# Patient Record
Sex: FEMALE | Race: WHITE | NOT HISPANIC OR LATINO | Employment: OTHER | URBAN - METROPOLITAN AREA
[De-identification: names, ages, dates, MRNs, and addresses within clinical notes are randomized per-mention and may not be internally consistent; named-entity substitution may affect disease eponyms.]

---

## 2020-05-04 ENCOUNTER — APPOINTMENT (OUTPATIENT)
Dept: CT IMAGING | Facility: HOSPITAL | Age: 70
End: 2020-05-04

## 2020-05-04 ENCOUNTER — HOSPITAL ENCOUNTER (OUTPATIENT)
Facility: HOSPITAL | Age: 70
Setting detail: OBSERVATION
Discharge: HOME HEALTH | End: 2020-05-07
Attending: EMERGENCY MEDICINE | Admitting: INTERNAL MEDICINE

## 2020-05-04 DIAGNOSIS — Z86.79 HISTORY OF HYPERTENSION: ICD-10-CM

## 2020-05-04 DIAGNOSIS — N28.9 RENAL INSUFFICIENCY: ICD-10-CM

## 2020-05-04 DIAGNOSIS — N17.9 AKI (ACUTE KIDNEY INJURY) (HCC): ICD-10-CM

## 2020-05-04 DIAGNOSIS — I10 ESSENTIAL HYPERTENSION: ICD-10-CM

## 2020-05-04 DIAGNOSIS — Z87.81 STATUS POST CLOSED FRACTURE OF LEFT HIP: ICD-10-CM

## 2020-05-04 DIAGNOSIS — R41.82 ALTERED MENTAL STATUS, UNSPECIFIED ALTERED MENTAL STATUS TYPE: Primary | ICD-10-CM

## 2020-05-04 DIAGNOSIS — Z74.09 IMPAIRED MOBILITY AND ADLS: ICD-10-CM

## 2020-05-04 DIAGNOSIS — Z86.39 HISTORY OF HYPERLIPIDEMIA: ICD-10-CM

## 2020-05-04 DIAGNOSIS — Z78.9 IMPAIRED MOBILITY AND ADLS: ICD-10-CM

## 2020-05-04 DIAGNOSIS — J44.9 CHRONIC OBSTRUCTIVE PULMONARY DISEASE, UNSPECIFIED COPD TYPE (HCC): ICD-10-CM

## 2020-05-04 LAB
ALBUMIN SERPL-MCNC: 4.1 G/DL (ref 3.5–5.2)
ALBUMIN/GLOB SERPL: 1.1 G/DL
ALP SERPL-CCNC: 84 U/L (ref 39–117)
ALT SERPL W P-5'-P-CCNC: 23 U/L (ref 1–33)
ANION GAP SERPL CALCULATED.3IONS-SCNC: 14 MMOL/L (ref 5–15)
AST SERPL-CCNC: 20 U/L (ref 1–32)
BACTERIA UR QL AUTO: NORMAL /HPF
BASOPHILS # BLD AUTO: 0.07 10*3/MM3 (ref 0–0.2)
BASOPHILS NFR BLD AUTO: 0.6 % (ref 0–1.5)
BILIRUB SERPL-MCNC: 0.2 MG/DL (ref 0.2–1.2)
BILIRUB UR QL STRIP: NEGATIVE
BUN BLD-MCNC: 32 MG/DL (ref 8–23)
BUN/CREAT SERPL: 24.2 (ref 7–25)
CALCIUM SPEC-SCNC: 9.8 MG/DL (ref 8.6–10.5)
CHLORIDE SERPL-SCNC: 102 MMOL/L (ref 98–107)
CK SERPL-CCNC: 91 U/L (ref 20–180)
CLARITY UR: CLEAR
CO2 SERPL-SCNC: 26 MMOL/L (ref 22–29)
COLOR UR: YELLOW
CREAT BLD-MCNC: 1.32 MG/DL (ref 0.57–1)
D-LACTATE SERPL-SCNC: 1 MMOL/L (ref 0.5–2)
DEPRECATED RDW RBC AUTO: 45.1 FL (ref 37–54)
EOSINOPHIL # BLD AUTO: 0.19 10*3/MM3 (ref 0–0.4)
EOSINOPHIL NFR BLD AUTO: 1.7 % (ref 0.3–6.2)
ERYTHROCYTE [DISTWIDTH] IN BLOOD BY AUTOMATED COUNT: 12.1 % (ref 12.3–15.4)
GFR SERPL CREATININE-BSD FRML MDRD: 40 ML/MIN/1.73
GLOBULIN UR ELPH-MCNC: 3.6 GM/DL
GLUCOSE BLD-MCNC: 131 MG/DL (ref 65–99)
GLUCOSE UR STRIP-MCNC: NEGATIVE MG/DL
HCT VFR BLD AUTO: 33.3 % (ref 34–46.6)
HGB BLD-MCNC: 10.6 G/DL (ref 12–15.9)
HGB UR QL STRIP.AUTO: NEGATIVE
HOLD SPECIMEN: NORMAL
HOLD SPECIMEN: NORMAL
HYALINE CASTS UR QL AUTO: NORMAL /LPF
IMM GRANULOCYTES # BLD AUTO: 0.04 10*3/MM3 (ref 0–0.05)
IMM GRANULOCYTES NFR BLD AUTO: 0.3 % (ref 0–0.5)
KETONES UR QL STRIP: NEGATIVE
LEUKOCYTE ESTERASE UR QL STRIP.AUTO: NEGATIVE
LIPASE SERPL-CCNC: 36 U/L (ref 13–60)
LYMPHOCYTES # BLD AUTO: 2.32 10*3/MM3 (ref 0.7–3.1)
LYMPHOCYTES NFR BLD AUTO: 20.2 % (ref 19.6–45.3)
MCH RBC QN AUTO: 32.3 PG (ref 26.6–33)
MCHC RBC AUTO-ENTMCNC: 31.8 G/DL (ref 31.5–35.7)
MCV RBC AUTO: 101.5 FL (ref 79–97)
MONOCYTES # BLD AUTO: 1.12 10*3/MM3 (ref 0.1–0.9)
MONOCYTES NFR BLD AUTO: 9.7 % (ref 5–12)
NEUTROPHILS # BLD AUTO: 7.76 10*3/MM3 (ref 1.7–7)
NEUTROPHILS NFR BLD AUTO: 67.5 % (ref 42.7–76)
NITRITE UR QL STRIP: NEGATIVE
NRBC BLD AUTO-RTO: 0 /100 WBC (ref 0–0.2)
NT-PROBNP SERPL-MCNC: 2622 PG/ML (ref 5–900)
PH UR STRIP.AUTO: 7 [PH] (ref 5–8)
PLATELET # BLD AUTO: 464 10*3/MM3 (ref 140–450)
PMV BLD AUTO: 9.6 FL (ref 6–12)
POTASSIUM BLD-SCNC: 4.4 MMOL/L (ref 3.5–5.2)
PROT SERPL-MCNC: 7.7 G/DL (ref 6–8.5)
PROT UR QL STRIP: ABNORMAL
RBC # BLD AUTO: 3.28 10*6/MM3 (ref 3.77–5.28)
RBC # UR: NORMAL /HPF
REF LAB TEST METHOD: NORMAL
SODIUM BLD-SCNC: 142 MMOL/L (ref 136–145)
SP GR UR STRIP: 1.01 (ref 1–1.03)
SQUAMOUS #/AREA URNS HPF: NORMAL /HPF
TROPONIN T SERPL-MCNC: <0.01 NG/ML (ref 0–0.03)
UROBILINOGEN UR QL STRIP: ABNORMAL
WBC NRBC COR # BLD: 11.5 10*3/MM3 (ref 3.4–10.8)
WBC UR QL AUTO: NORMAL /HPF
WHOLE BLOOD HOLD SPECIMEN: NORMAL
WHOLE BLOOD HOLD SPECIMEN: NORMAL

## 2020-05-04 PROCEDURE — 84484 ASSAY OF TROPONIN QUANT: CPT | Performed by: NURSE PRACTITIONER

## 2020-05-04 PROCEDURE — 82550 ASSAY OF CK (CPK): CPT | Performed by: NURSE PRACTITIONER

## 2020-05-04 PROCEDURE — 96365 THER/PROPH/DIAG IV INF INIT: CPT

## 2020-05-04 PROCEDURE — 81001 URINALYSIS AUTO W/SCOPE: CPT | Performed by: EMERGENCY MEDICINE

## 2020-05-04 PROCEDURE — 93005 ELECTROCARDIOGRAM TRACING: CPT | Performed by: NURSE PRACTITIONER

## 2020-05-04 PROCEDURE — 83690 ASSAY OF LIPASE: CPT | Performed by: EMERGENCY MEDICINE

## 2020-05-04 PROCEDURE — 96375 TX/PRO/DX INJ NEW DRUG ADDON: CPT

## 2020-05-04 PROCEDURE — 84145 PROCALCITONIN (PCT): CPT | Performed by: INTERNAL MEDICINE

## 2020-05-04 PROCEDURE — 70450 CT HEAD/BRAIN W/O DYE: CPT

## 2020-05-04 PROCEDURE — 25010000002 ONDANSETRON PER 1 MG: Performed by: EMERGENCY MEDICINE

## 2020-05-04 PROCEDURE — 80306 DRUG TEST PRSMV INSTRMNT: CPT | Performed by: NURSE PRACTITIONER

## 2020-05-04 PROCEDURE — 99285 EMERGENCY DEPT VISIT HI MDM: CPT

## 2020-05-04 PROCEDURE — 87040 BLOOD CULTURE FOR BACTERIA: CPT | Performed by: NURSE PRACTITIONER

## 2020-05-04 PROCEDURE — 96366 THER/PROPH/DIAG IV INF ADDON: CPT

## 2020-05-04 PROCEDURE — 71250 CT THORAX DX C-: CPT

## 2020-05-04 PROCEDURE — 85025 COMPLETE CBC W/AUTO DIFF WBC: CPT

## 2020-05-04 PROCEDURE — 83880 ASSAY OF NATRIURETIC PEPTIDE: CPT | Performed by: NURSE PRACTITIONER

## 2020-05-04 PROCEDURE — 80053 COMPREHEN METABOLIC PANEL: CPT | Performed by: EMERGENCY MEDICINE

## 2020-05-04 PROCEDURE — 83605 ASSAY OF LACTIC ACID: CPT | Performed by: NURSE PRACTITIONER

## 2020-05-04 RX ORDER — CARVEDILOL 12.5 MG/1
12.5 TABLET ORAL 2 TIMES DAILY WITH MEALS
COMMUNITY

## 2020-05-04 RX ORDER — ROSUVASTATIN CALCIUM 10 MG/1
10 TABLET, COATED ORAL DAILY
COMMUNITY

## 2020-05-04 RX ORDER — ONDANSETRON 2 MG/ML
4 INJECTION INTRAMUSCULAR; INTRAVENOUS ONCE
Status: COMPLETED | OUTPATIENT
Start: 2020-05-04 | End: 2020-05-04

## 2020-05-04 RX ORDER — CETIRIZINE HYDROCHLORIDE 10 MG/1
10 TABLET ORAL DAILY
COMMUNITY

## 2020-05-04 RX ORDER — SODIUM CHLORIDE 0.9 % (FLUSH) 0.9 %
10 SYRINGE (ML) INJECTION AS NEEDED
Status: DISCONTINUED | OUTPATIENT
Start: 2020-05-04 | End: 2020-05-07 | Stop reason: HOSPADM

## 2020-05-04 RX ORDER — ASPIRIN 81 MG/1
81 TABLET, CHEWABLE ORAL DAILY
COMMUNITY
End: 2021-05-25 | Stop reason: HOSPADM

## 2020-05-04 RX ORDER — DOCUSATE SODIUM 100 MG/1
100 CAPSULE, LIQUID FILLED ORAL 2 TIMES DAILY
COMMUNITY

## 2020-05-04 RX ORDER — HYDROMORPHONE HYDROCHLORIDE 1 MG/ML
0.5 INJECTION, SOLUTION INTRAMUSCULAR; INTRAVENOUS; SUBCUTANEOUS ONCE
Status: DISCONTINUED | OUTPATIENT
Start: 2020-05-04 | End: 2020-05-04

## 2020-05-04 RX ORDER — MONTELUKAST SODIUM 10 MG/1
10 TABLET ORAL DAILY
COMMUNITY

## 2020-05-04 RX ORDER — LISINOPRIL 5 MG/1
5 TABLET ORAL DAILY
COMMUNITY

## 2020-05-04 RX ADMIN — ONDANSETRON 4 MG: 2 INJECTION INTRAMUSCULAR; INTRAVENOUS at 21:51

## 2020-05-04 RX ADMIN — SODIUM CHLORIDE 500 ML: 9 INJECTION, SOLUTION INTRAVENOUS at 21:04

## 2020-05-05 ENCOUNTER — APPOINTMENT (OUTPATIENT)
Dept: ULTRASOUND IMAGING | Facility: HOSPITAL | Age: 70
End: 2020-05-05

## 2020-05-05 ENCOUNTER — APPOINTMENT (OUTPATIENT)
Dept: CT IMAGING | Facility: HOSPITAL | Age: 70
End: 2020-05-05

## 2020-05-05 PROBLEM — N17.9 AKI (ACUTE KIDNEY INJURY) (HCC): Status: ACTIVE | Noted: 2020-05-05

## 2020-05-05 PROBLEM — F10.20 ALCOHOLISM (HCC): Status: ACTIVE | Noted: 2020-05-05

## 2020-05-05 PROBLEM — I10 ESSENTIAL HYPERTENSION: Status: ACTIVE | Noted: 2020-05-05

## 2020-05-05 PROBLEM — D72.829 LEUKOCYTOSIS: Status: ACTIVE | Noted: 2020-05-05

## 2020-05-05 PROBLEM — R10.9 ABDOMINAL PAIN: Status: ACTIVE | Noted: 2020-05-05

## 2020-05-05 PROBLEM — J44.9 COPD (CHRONIC OBSTRUCTIVE PULMONARY DISEASE) (HCC): Status: ACTIVE | Noted: 2020-05-05

## 2020-05-05 PROBLEM — D64.9 ANEMIA: Status: ACTIVE | Noted: 2020-05-05

## 2020-05-05 PROBLEM — R11.2 INTRACTABLE NAUSEA AND VOMITING: Status: ACTIVE | Noted: 2020-05-05

## 2020-05-05 PROBLEM — R41.82 AMS (ALTERED MENTAL STATUS): Status: ACTIVE | Noted: 2020-05-05

## 2020-05-05 LAB
ALBUMIN SERPL-MCNC: 3.7 G/DL (ref 3.5–5.2)
ALBUMIN/GLOB SERPL: 1.2 G/DL
ALP SERPL-CCNC: 77 U/L (ref 39–117)
ALT SERPL W P-5'-P-CCNC: 20 U/L (ref 1–33)
AMMONIA BLD-SCNC: 41 UMOL/L (ref 11–51)
AMPHET+METHAMPHET UR QL: NEGATIVE
AMPHETAMINES UR QL: NEGATIVE
ANION GAP SERPL CALCULATED.3IONS-SCNC: 12 MMOL/L (ref 5–15)
AST SERPL-CCNC: 19 U/L (ref 1–32)
BARBITURATES UR QL SCN: NEGATIVE
BENZODIAZ UR QL SCN: NEGATIVE
BILIRUB SERPL-MCNC: 0.3 MG/DL (ref 0.2–1.2)
BUN BLD-MCNC: 20 MG/DL (ref 8–23)
BUN/CREAT SERPL: 18.5 (ref 7–25)
BUPRENORPHINE SERPL-MCNC: NEGATIVE NG/ML
CALCIUM SPEC-SCNC: 9.4 MG/DL (ref 8.6–10.5)
CANNABINOIDS SERPL QL: NEGATIVE
CHLORIDE SERPL-SCNC: 99 MMOL/L (ref 98–107)
CO2 SERPL-SCNC: 25 MMOL/L (ref 22–29)
COCAINE UR QL: NEGATIVE
CREAT BLD-MCNC: 1.08 MG/DL (ref 0.57–1)
DEPRECATED RDW RBC AUTO: 44.6 FL (ref 37–54)
ERYTHROCYTE [DISTWIDTH] IN BLOOD BY AUTOMATED COUNT: 12.1 % (ref 12.3–15.4)
ETHANOL BLD-MCNC: <10 MG/DL (ref 0–10)
FOLATE SERPL-MCNC: >20 NG/ML (ref 4.78–24.2)
GFR SERPL CREATININE-BSD FRML MDRD: 50 ML/MIN/1.73
GLOBULIN UR ELPH-MCNC: 3.1 GM/DL
GLUCOSE BLD-MCNC: 89 MG/DL (ref 65–99)
HCT VFR BLD AUTO: 30.8 % (ref 34–46.6)
HGB BLD-MCNC: 10.1 G/DL (ref 12–15.9)
MCH RBC QN AUTO: 33.2 PG (ref 26.6–33)
MCHC RBC AUTO-ENTMCNC: 32.8 G/DL (ref 31.5–35.7)
MCV RBC AUTO: 101.3 FL (ref 79–97)
METHADONE UR QL SCN: NEGATIVE
OPIATES UR QL: POSITIVE
OXYCODONE UR QL SCN: NEGATIVE
PCP UR QL SCN: NEGATIVE
PLATELET # BLD AUTO: 407 10*3/MM3 (ref 140–450)
PMV BLD AUTO: 9.6 FL (ref 6–12)
POTASSIUM BLD-SCNC: 4.2 MMOL/L (ref 3.5–5.2)
PROCALCITONIN SERPL-MCNC: 0.08 NG/ML (ref 0.1–0.25)
PROPOXYPH UR QL: NEGATIVE
PROT SERPL-MCNC: 6.8 G/DL (ref 6–8.5)
RBC # BLD AUTO: 3.04 10*6/MM3 (ref 3.77–5.28)
SODIUM BLD-SCNC: 136 MMOL/L (ref 136–145)
TRICYCLICS UR QL SCN: NEGATIVE
VIT B12 BLD-MCNC: 562 PG/ML (ref 211–946)
WBC NRBC COR # BLD: 9.34 10*3/MM3 (ref 3.4–10.8)

## 2020-05-05 PROCEDURE — 97162 PT EVAL MOD COMPLEX 30 MIN: CPT

## 2020-05-05 PROCEDURE — 25010000002 HEPARIN (PORCINE) PER 1000 UNITS: Performed by: INTERNAL MEDICINE

## 2020-05-05 PROCEDURE — 82140 ASSAY OF AMMONIA: CPT | Performed by: INTERNAL MEDICINE

## 2020-05-05 PROCEDURE — 82607 VITAMIN B-12: CPT | Performed by: PHYSICIAN ASSISTANT

## 2020-05-05 PROCEDURE — 96366 THER/PROPH/DIAG IV INF ADDON: CPT

## 2020-05-05 PROCEDURE — 74177 CT ABD & PELVIS W/CONTRAST: CPT

## 2020-05-05 PROCEDURE — 80307 DRUG TEST PRSMV CHEM ANLYZR: CPT | Performed by: PHYSICIAN ASSISTANT

## 2020-05-05 PROCEDURE — G0378 HOSPITAL OBSERVATION PER HR: HCPCS

## 2020-05-05 PROCEDURE — 94799 UNLISTED PULMONARY SVC/PX: CPT

## 2020-05-05 PROCEDURE — 25010000002 THIAMINE PER 100 MG: Performed by: PHYSICIAN ASSISTANT

## 2020-05-05 PROCEDURE — 80053 COMPREHEN METABOLIC PANEL: CPT | Performed by: PHYSICIAN ASSISTANT

## 2020-05-05 PROCEDURE — 25010000002 THIAMINE PER 100 MG: Performed by: INTERNAL MEDICINE

## 2020-05-05 PROCEDURE — 25010000002 IOPAMIDOL 61 % SOLUTION: Performed by: EMERGENCY MEDICINE

## 2020-05-05 PROCEDURE — 76775 US EXAM ABDO BACK WALL LIM: CPT

## 2020-05-05 PROCEDURE — 96372 THER/PROPH/DIAG INJ SC/IM: CPT

## 2020-05-05 PROCEDURE — 84425 ASSAY OF VITAMIN B-1: CPT | Performed by: INTERNAL MEDICINE

## 2020-05-05 PROCEDURE — 96365 THER/PROPH/DIAG IV INF INIT: CPT

## 2020-05-05 PROCEDURE — 82746 ASSAY OF FOLIC ACID SERUM: CPT | Performed by: PHYSICIAN ASSISTANT

## 2020-05-05 PROCEDURE — 99220 PR INITIAL OBSERVATION CARE/DAY 70 MINUTES: CPT | Performed by: INTERNAL MEDICINE

## 2020-05-05 PROCEDURE — 85027 COMPLETE CBC AUTOMATED: CPT | Performed by: PHYSICIAN ASSISTANT

## 2020-05-05 RX ORDER — SODIUM CHLORIDE 0.9 % (FLUSH) 0.9 %
10 SYRINGE (ML) INJECTION AS NEEDED
Status: DISCONTINUED | OUTPATIENT
Start: 2020-05-05 | End: 2020-05-07 | Stop reason: HOSPADM

## 2020-05-05 RX ORDER — SODIUM CHLORIDE 9 MG/ML
75 INJECTION, SOLUTION INTRAVENOUS CONTINUOUS
Status: DISCONTINUED | OUTPATIENT
Start: 2020-05-05 | End: 2020-05-07

## 2020-05-05 RX ORDER — DOCUSATE SODIUM 100 MG/1
100 CAPSULE, LIQUID FILLED ORAL 2 TIMES DAILY PRN
Status: DISCONTINUED | OUTPATIENT
Start: 2020-05-05 | End: 2020-05-07 | Stop reason: HOSPADM

## 2020-05-05 RX ORDER — HEPARIN SODIUM 5000 [USP'U]/ML
5000 INJECTION, SOLUTION INTRAVENOUS; SUBCUTANEOUS EVERY 12 HOURS SCHEDULED
Status: DISCONTINUED | OUTPATIENT
Start: 2020-05-05 | End: 2020-05-07 | Stop reason: HOSPADM

## 2020-05-05 RX ORDER — ASPIRIN 81 MG/1
81 TABLET, CHEWABLE ORAL DAILY
Status: DISCONTINUED | OUTPATIENT
Start: 2020-05-05 | End: 2020-05-07 | Stop reason: HOSPADM

## 2020-05-05 RX ORDER — ACETAMINOPHEN 325 MG/1
650 TABLET ORAL EVERY 4 HOURS PRN
Status: DISCONTINUED | OUTPATIENT
Start: 2020-05-05 | End: 2020-05-07 | Stop reason: HOSPADM

## 2020-05-05 RX ORDER — LISINOPRIL 5 MG/1
5 TABLET ORAL DAILY
Status: CANCELLED | OUTPATIENT
Start: 2020-05-05

## 2020-05-05 RX ORDER — LIDOCAINE 50 MG/G
1 PATCH TOPICAL
Status: DISCONTINUED | OUTPATIENT
Start: 2020-05-05 | End: 2020-05-07 | Stop reason: HOSPADM

## 2020-05-05 RX ORDER — ACETAMINOPHEN, ASPIRIN AND CAFFEINE 250; 250; 65 MG/1; MG/1; MG/1
2 TABLET, FILM COATED ORAL ONCE
Status: COMPLETED | OUTPATIENT
Start: 2020-05-05 | End: 2020-05-05

## 2020-05-05 RX ORDER — CETIRIZINE HYDROCHLORIDE 10 MG/1
5 TABLET ORAL DAILY
Status: DISCONTINUED | OUTPATIENT
Start: 2020-05-05 | End: 2020-05-07 | Stop reason: HOSPADM

## 2020-05-05 RX ORDER — CHOLECALCIFEROL (VITAMIN D3) 125 MCG
5 CAPSULE ORAL NIGHTLY PRN
Status: DISCONTINUED | OUTPATIENT
Start: 2020-05-05 | End: 2020-05-07 | Stop reason: HOSPADM

## 2020-05-05 RX ORDER — CARVEDILOL 12.5 MG/1
12.5 TABLET ORAL 2 TIMES DAILY WITH MEALS
Status: DISCONTINUED | OUTPATIENT
Start: 2020-05-05 | End: 2020-05-07 | Stop reason: HOSPADM

## 2020-05-05 RX ORDER — ROSUVASTATIN CALCIUM 10 MG/1
10 TABLET, COATED ORAL DAILY
Status: DISCONTINUED | OUTPATIENT
Start: 2020-05-05 | End: 2020-05-07 | Stop reason: HOSPADM

## 2020-05-05 RX ORDER — BACLOFEN 20 MG/1
20 TABLET ORAL DAILY PRN
COMMUNITY
End: 2020-05-07 | Stop reason: HOSPADM

## 2020-05-05 RX ORDER — IPRATROPIUM BROMIDE AND ALBUTEROL SULFATE 2.5; .5 MG/3ML; MG/3ML
3 SOLUTION RESPIRATORY (INHALATION) EVERY 4 HOURS PRN
Status: DISCONTINUED | OUTPATIENT
Start: 2020-05-05 | End: 2020-05-07 | Stop reason: HOSPADM

## 2020-05-05 RX ORDER — ONDANSETRON 2 MG/ML
4 INJECTION INTRAMUSCULAR; INTRAVENOUS EVERY 6 HOURS PRN
Status: DISCONTINUED | OUTPATIENT
Start: 2020-05-05 | End: 2020-05-07 | Stop reason: HOSPADM

## 2020-05-05 RX ORDER — MONTELUKAST SODIUM 10 MG/1
10 TABLET ORAL NIGHTLY
Status: DISCONTINUED | OUTPATIENT
Start: 2020-05-05 | End: 2020-05-07 | Stop reason: HOSPADM

## 2020-05-05 RX ORDER — SODIUM CHLORIDE 0.9 % (FLUSH) 0.9 %
10 SYRINGE (ML) INJECTION EVERY 12 HOURS SCHEDULED
Status: DISCONTINUED | OUTPATIENT
Start: 2020-05-05 | End: 2020-05-07 | Stop reason: HOSPADM

## 2020-05-05 RX ADMIN — FOLIC ACID 100 ML/HR: 5 INJECTION, SOLUTION INTRAMUSCULAR; INTRAVENOUS; SUBCUTANEOUS at 21:27

## 2020-05-05 RX ADMIN — ACETAMINOPHEN 650 MG: 325 TABLET, FILM COATED ORAL at 18:25

## 2020-05-05 RX ADMIN — ASPIRIN 81 MG 81 MG: 81 TABLET ORAL at 08:22

## 2020-05-05 RX ADMIN — CARVEDILOL 12.5 MG: 12.5 TABLET, FILM COATED ORAL at 08:22

## 2020-05-05 RX ADMIN — ACETAMINOPHEN 650 MG: 325 TABLET, FILM COATED ORAL at 14:13

## 2020-05-05 RX ADMIN — SODIUM CHLORIDE, PRESERVATIVE FREE 10 ML: 5 INJECTION INTRAVENOUS at 21:23

## 2020-05-05 RX ADMIN — SODIUM CHLORIDE 75 ML/HR: 9 INJECTION, SOLUTION INTRAVENOUS at 18:26

## 2020-05-05 RX ADMIN — LIDOCAINE 1 PATCH: 50 PATCH CUTANEOUS at 11:10

## 2020-05-05 RX ADMIN — FOLIC ACID 100 ML/HR: 5 INJECTION, SOLUTION INTRAMUSCULAR; INTRAVENOUS; SUBCUTANEOUS at 01:15

## 2020-05-05 RX ADMIN — CETIRIZINE HYDROCHLORIDE 5 MG: 10 TABLET, FILM COATED ORAL at 08:22

## 2020-05-05 RX ADMIN — MELATONIN TAB 5 MG 5 MG: 5 TAB at 21:22

## 2020-05-05 RX ADMIN — MONTELUKAST SODIUM 10 MG: 10 TABLET, COATED ORAL at 21:22

## 2020-05-05 RX ADMIN — HEPARIN SODIUM 5000 UNITS: 5000 INJECTION, SOLUTION INTRAVENOUS; SUBCUTANEOUS at 21:21

## 2020-05-05 RX ADMIN — SODIUM CHLORIDE, PRESERVATIVE FREE 10 ML: 5 INJECTION INTRAVENOUS at 08:24

## 2020-05-05 RX ADMIN — IOPAMIDOL 60 ML: 612 INJECTION, SOLUTION INTRAVENOUS at 01:28

## 2020-05-05 RX ADMIN — FOLIC ACID 100 ML/HR: 5 INJECTION, SOLUTION INTRAMUSCULAR; INTRAVENOUS; SUBCUTANEOUS at 08:23

## 2020-05-05 RX ADMIN — ROSUVASTATIN CALCIUM 10 MG: 10 TABLET, COATED ORAL at 08:23

## 2020-05-05 RX ADMIN — ACETAMINOPHEN, ASPIRIN AND CAFFEINE 2 TABLET: 250; 250; 65 TABLET, FILM COATED ORAL at 21:22

## 2020-05-05 RX ADMIN — FOLIC ACID 100 ML/HR: 5 INJECTION, SOLUTION INTRAMUSCULAR; INTRAVENOUS; SUBCUTANEOUS at 21:22

## 2020-05-05 RX ADMIN — CARVEDILOL 12.5 MG: 12.5 TABLET, FILM COATED ORAL at 17:31

## 2020-05-05 NOTE — PROGRESS NOTES
Discharge Planning Assessment  [facility]     Patient Name: Emily Cevallos  MRN: [id number]  Today's Date: 5/5/2020    Admit Date: 5/4/2020    Discharge Needs Assessment     Row Name 05/05/20 1521       Living Environment    Lives With  friend(s) Pt resides in [facility]    Name(s) of Who Lives With Patient  Friend/poa Tara Bear    Current Living Arrangements  home/apartment/condo    Primary Care Provided by  friend    Provides Primary Care For  no one, unable/limited ability to care for self    Family Caregiver if Needed  friend(s)    Family Caregiver Names  Tara Bear    Quality of Family Relationships  helpful;involved;supportive    Able to Return to Prior Arrangements  other (see comments) TBD       Resource/Environmental Concerns    Resource/Environmental Concerns  none       Transition Planning    Patient/Family Anticipates Transition to  inpatient rehabilitation facility    Patient/Family Anticipated Services at Transition  rehabilitation services    Transportation Anticipated  family or friend will provide       Discharge Needs Assessment    Readmission Within the Last 30 Days  other (see comments) pt was at [facility] on 4/4 related to hip fracture    Concerns to be Addressed  discharge planning    Equipment Currently Used at Home  walker, rolling;commode    Anticipated Changes Related to Illness  none    Equipment Needed After Discharge  none    Outpatient/Agency/Support Group Needs  skilled nursing facility    Discharge Facility/Level of Care Needs  nursing facility, skilled    Provided Post Acute Provider List?  Yes    Post Acute Provider List  Inpatient Rehab    Delivered To  Patient    Method of Delivery  In person    Patient's Choice of Community Agency(s)  [facility], [facility] or [facility]        Discharge Plan     Row Name 05/05/20 1524       Plan    Plan  SNF, skilled    Provided Post Acute Provider Quality & Resource List?  Yes    Post Acute Provider  Quality and Resource List  Inpatient Rehab    Patient/Family in Agreement with Plan  yes    Plan Comments  CM spoke with pt at bedside. Pt reports she lives with her friend Tara Bear who is also her POA. Pt reports she currently needs more assistance than normal as she recently fractured her hip and had surgery at [facility] and then went to [facility] for approximately 2 weeks. Pt has a rolling walker and bedside commode. Pt is not current with home health or outpatient services at this time. CM discussed discharge options with pt including inpt skilled rehab and home with home health.  Pt is agreeable to go to skilled rehab for continued inpatient care. CM reviewed available SNF options with pt that are covered by her insurance including [facility], [facility], [facility] and [facility]. Pt reports she has cousins that have been to [place] and she lives near [facility] however has no preference. CM made referrals to all facilities and will await PT/OT evals and facilities to review for bed offers. Pt requested KORY to update JENNIFER Pacheco of plans. KORY spoke with Tara via phone and she is agreeable to discharge plans to SNF. KORY explained observation status and Tara verbalized understanding. KORY will continue to follow.     Final Discharge Disposition Code  03 - skilled nursing facility (SNF)        Destination      Service Provider Request Status Selected Services Address Phone Number Fax Number    [facility] Pending - No Request Sent N/A [address], [place] [id number] [phone]    [facility] Pending - No Request Sent N/A [address], [facility] [address] [phone]    [facility] Pending - No Request Sent N/A [address], [place] [id number] [phone]    [facility] Pending - No Request Sent N/A [address], [facility] [address]  [phone]      Durable Medical Equipment      Coordination has not been started for this encounter.      Dialysis/Infusion      Coordination has not been started for this encounter.      Home Medical Care      Coordination has not been started for this encounter.      Therapy      Coordination has not been started for this encounter.      Community Resources      Coordination has not been started for this encounter.          Demographic Summary     Row Name 05/05/20 1512       General Information    Admission Type  observation    Referral Source  admission list    Reason for Consult  discharge planning    Preferred Language  English    General Information Comments  PCP- FLORENCE Major       Contact Information    Permission Granted to Share Info With          Functional Status     Row Name 05/05/20 1513       Functional Status    Usual Activity Tolerance  fair    Current Activity Tolerance  other (see comments) awaiting pt/ot evals       Functional Status, IADL    Medications  assistive person    Meal Preparation  assistive equipment and person    Housekeeping  assistive equipment and person    Laundry  assistive equipment and person    Shopping  assistive equipment and person       Employment/    Employment/ Comments  Pt confirms she has [employer] Medicare, denies concerns or disruption in coverage. Pt has prescription coverage and denies issues obtaining or affording current medications.         Psychosocial    No documentation.       Abuse/Neglect    No documentation.       Legal    No documentation.       Substance Abuse    No documentation.       Patient Forms    No documentation.           Lara Shepherd RN

## 2020-05-05 NOTE — PLAN OF CARE
Problem: Patient Care Overview  Goal: Plan of Care Review  Outcome: Ongoing (interventions implemented as appropriate)  Flowsheets (Taken 5/5/2020 3118)  Progress: improving  Plan of Care Reviewed With: patient  Outcome Summary: PT eval is completed patient is able to perform bed mobility with min assist and is able to ambulate 30 ft with min assist of 1 using walker, anticipate patient will be able to go home with friend's assist at D/C

## 2020-05-05 NOTE — CONSULTS
"Clinical Nutrition   Reason For Visit: Identified at risk by screening criteria, Nurse practioner/physician assistent consult, Malnutrition Severity Assessment, BMI    Patient Name: Emily Cevallos  YOB: 1950  MRN: [id number]  Date of Encounter: 05/05/20 12:32  Admission date: 5/4/2020      Nutrition Assessment     Admission Problem List:  Pt had a recent fall (4/4) and sustained a L hip fracture, s/p ORIF (4/5) at OSH, pt then went to [facility] for 2 weeks and was discharged home. Pt readmitted to OSH from (4/26-5/1) with confusion.     AMS  LIDA      Applicable PMH:  HTN  COPD  Tongue cancer  EtOH abuse  Recent L hip fracture, s/p ORIF (4/5/2020)       Reported/Observed/Food/Nutrition Related History     Pt reports good appetite, eating lunch at time of RD visit. States that she was eating well at home and at [facility], but that she was not eating well at [facility] because she did not like the food/food tasted bland. States that she does not have any food allergies. Reports that her teeth do not fit properly, which makes it difficult for her to chew, pt requesting soft textures/ground meats. Pt states that she drinks chocolate Ensure 1x/day at home. Pt reports that she does not have any food preferences, states that she is not a picky eater.      Anthropometrics   Height: 67 in  Weight: 97 lb per stated wt (5/4)  BMI: 15.2  BMI classification: Underweight:<18.5kg/m2     UBW: 115 lb per pt- pt states that she last weighed this \"a long time ago.\"  Weight change: Pt reports that she has lost weight. Upon further interview, pt states that she weighed 97 lb at [facility]. Pt then states that she weighed 106 lb at a doctor's appointment. RD asked pt when the doctor's appointment was, but pt states that she does not remember. Pt reports that she weighed 115 lb \"a long time ago\", but pt does not know the time frame or amount of weight loss. Unfortunately, the only weight that is available in Epic is a stated weight. Would be " beneficial to obtain and document a measured/objective weight to better determine pt's current weight.     Date Weight (kg) Weight (lbs) Weight Method   5/4/2020 43.999 kg 97 lb Stated       Labs reviewed   Labs reviewed: Yes    Results from last 7 days   Lab Units 05/05/20  0752 05/04/20  1829   SODIUM mmol/L 136 142   POTASSIUM mmol/L 4.2 4.4   CHLORIDE mmol/L 99 102   CO2 mmol/L 25.0 26.0   BUN mg/dL 20 32*   CREATININE mg/dL 1.08* 1.32*   GLUCOSE mg/dL 89 131*   CALCIUM mg/dL 9.4 9.8     Results from last 7 days   Lab Units 05/05/20  0752 05/04/20  1829   WBC 10*3/mm3 9.34 11.50*   ALBUMIN g/dL 3.70 4.10         Lab Results   Lab Value Date/Time    HGBA1C 4.70 (L) 05/25/2017 1224     Medications reviewed   Medications reviewed: Yes  Pertinent: rally pack  GTT: NS at 75 ml/hr      Current Nutrition Prescription   PO: Diet Soft Texture; Ground-- entered by RD prior to charting, pt was on a regular diet prior to this      Evaluation of Received Nutrient/Fluid Intake-PO:  Insufficient data       Nutrition Diagnosis   5/5  Problem Underweight   Etiology Unsure   Signs/Symptoms Unclear if this has been acute vs chronic. Pt reports good appetite.        Intervention   Intervention: Follow treatment progress, Care plan reviewed, Interview for preferences, Menu adjusted, Encourage intake, Supplement provided   -Diet order adjusted- soft/ground  -Will send chocolate Ensure 3x/day  -Will order measured weight, as objective data is needed to better determine pt's current weight      Goal:   General: Nutrition support treatment  PO: Establish PO       Monitoring/Evaluation:       Monitoring/Evaluation: Per protocol, PO intake, Supplement intake, Pertinent labs, Weight, Symptoms     Will Continue to follow per protocol  Lanie Cheney MS RD/LD CNSC  Time Spent: 30 minutes   Declines

## 2020-05-05 NOTE — THERAPY EVALUATION
"Acute Care - Physical Therapy Initial Evaluation  [facility]     Patient Name: Emily Cevallos  : 1950  MRN: [id number]  Today's Date: 2020                Admit Date: 2020    Visit Dx:     ICD-10-CM ICD-9-CM   1. Altered mental status, unspecified altered mental status type R41.82 780.97   2. Renal insufficiency N28.9 593.9   3. History of hyperlipidemia Z86.39 V12.29   4. History of hypertension Z86.79 V12.59   5. Status post closed fracture of left hip Z87.81 V15.51     Patient Active Problem List   Diagnosis   • Essential hypertension   • Alcoholism (CMS/HCC)   • AMS (altered mental status)   • LIDA (acute kidney injury) (CMS/HCC)   • Anemia   • Leukocytosis   • COPD (chronic obstructive pulmonary disease) (CMS/HCC)   • Intractable nausea and vomiting   • Abdominal pain     Past Medical History:   Diagnosis Date   • Alcoholism (CMS/HCC)    • Cancer (CMS/HCC)     \"TONGUE CANCER\"   • Hypertension      Past Surgical History:   Procedure Laterality Date   • TOTAL HIP ARTHROPLASTY          PT ASSESSMENT (last 12 hours)      Physical Therapy Evaluation    No documentation.         PT Recommendation and Plan  Anticipated Discharge Disposition (PT): home with assist  Planned Therapy Interventions (PT Eval): balance training, bed mobility training, gait training, home exercise program, transfer training, strengthening  Therapy Frequency (PT Clinical Impression): daily  Outcome Summary/Treatment Plan (PT)  Anticipated Equipment Needs at Discharge (PT): front wheeled walker  Anticipated Discharge Disposition (PT): home with assist  Plan of Care Reviewed With: patient  Progress: improving  Outcome Summary: PT eval is completed patient is able to perform bed mobility with min assist and is able to ambulate 30 ft with min assist of 1 using walker, anticipate patient will be able to go home with friend's assist at D/C     Time Calculation:   PT Charges     Row Name 20 7337             Time Calculation    " Start Time  1430  -DAYANARA      PT Received On  05/05/20  -DAYANARA      PT Goal Re-Cert Due Date  05/15/20  -DAYANARA        User Key  (r) = Recorded By, (t) = Taken By, (c) = Cosigned By    Initials Name Provider Type    Juju Justice, PT Physical Therapist        Therapy Charges for Today     Code Description Service Date Service Provider Modifiers Qty    51040524463 HC PT EVAL MOD COMPLEXITY 4 5/5/2020 Juju Whyte, PT GP 1          PT G-Codes  Outcome Measure Options: AM-PAC 6 Clicks Basic Mobility (PT)  AM-PAC 6 Clicks Score (PT): 19      Juju Whyte, PT  5/5/2020

## 2020-05-05 NOTE — PROGRESS NOTES
[facility] Medicine Services  ADMISSION FOLLOW-UP NOTE          Patient admitted after midnight, H&P by my partner performed earlier on today's date reviewed.  Interim findings, labs, and charting also reviewed.        The [facility] Hospital Problem List has been managed and updated to include any new diagnoses:  Active Hospital Problems    Diagnosis  POA   • **AMS (altered mental status) [R41.82]  Yes   • Essential hypertension [I10]  Yes   • Alcoholism (CMS/[facility]) [F10.20]  Yes   • LIDA (acute kidney injury) (CMS/[facility]) [N17.9]  Yes   • Anemia [D64.9]  Yes   • Leukocytosis [D72.829]  Yes   • COPD (chronic obstructive pulmonary disease) (CMS/[facility]) [J44.9]  Yes   • Intractable nausea and vomiting [R11.2]  Yes   • Abdominal pain [R10.9]  Yes      Resolved Hospital Problems   No resolved problems to display.         ADDITIONAL PLAN:  - detailed assessment and plan form admission reviewed    Encephalopathy  -Has been worse at night per roommate  -History of alcohol abuse but roommate reports no drink since admission for fall/hip fracture  -Suspect there may be some contribution of opiates and her chronic baclofen.  Will explore non-narcotic, non-sedating alternatives  -CT head negative for acute change  -CT abdomen/pelvis negative for acute change.  Left renal lesion is simple cyst on renal ultrasound.  -B12 WNL, ammonia WNL, check thiamine and start IV thiamine supplement given history of EtOH use and low body weight     LIDA  -Improving with IV fluids  -Avoid nephrotoxins     COPD  -Scheduled duonebs     Hypertension  -Continue carvedilol  -Lisinopril on hold due to LIDA    Recent L hip fracture  -PT/OT     DVT prophylaxis:  SQ heparin        Admission Communication  Due to current limited visitation policies, an attempt will be made to update patient's identified best point-of-contact(s) at admission to discuss plan of care.   Contact: Tara Bear   Relation: Roommate/POA   Time of communication:  3:15pm   Notes (if applicable): Discussed patient status, plan of care, potential dispo plans              Electronically signed by Jessica Dai MD, 05/05/20, 7:15 PM.

## 2020-05-05 NOTE — H&P
[facility] Medicine Services  HISTORY AND PHYSICAL    Patient Name: Emily Cevallos  : 1950  MRN: [id number]  Primary Care Physician: Cornelia Galvez PA  Date of admission: 2020      Subjective   Subjective     Chief Complaint:  AMS    HPI:  Emily Cevallos is a 70 y.o. female with a past medical history significant for tongue cancer, hypertension, alcoholism, anemia, and COPD. She presents today with complaints of AMS. Last known normal, last night. HPI limited secondary to patient disposition. Patient lives at home with her long time roommate who is her caretaker and POA. Caretaker states the patient was lethargic, confused, and agitated last night. Soon thereafter, patient started vomiting. Had about 4-5 episodes of non bloody emesis. Caregiver was concerned and presented to ED today. There are no other complaints of fever, cough, congestion, syncope, chest pain, dysuria, constipation or diarrhea. Patient denies alcohol intake over the past month and has been down titrating her oral narcotics from recent surgery.     Records reviewed indicate patient had a mechanical fall 2020 wherein she sustained a left hip fracture. Subsequently had surgery with Dr. Vicente on 2020 at [facility]. Patient then recovered at [facility] for 2 weeks. After being discharged home, patient did well for a few days but suddenly became confused and agitated. Patient was then re-admitted to [facility] from 2020 to . Work up there for etiology of AMS was negative. Patient was at baseline mentation at discharge. Of note, patient was positive for cocaine and THC and admission to [facility].  It is unclear if this was false positive.  Patient denies abuse.    Emergency Department Evaluation; vitals stable. Creatinine 1.32. BUN 32. WBC 11.5. H/H 10.6 and 33.3. .5. Chemistry and hematology otherwise favorable. UDS positive for narcotics. CT head and chest negative.    Review of  "Systems   Constitutional: Negative for chills and fever.   HENT: Negative for congestion and trouble swallowing.    Eyes: Negative for photophobia and visual disturbance.   Respiratory: Negative for cough and shortness of breath.    Cardiovascular: Negative for chest pain and leg swelling.   Gastrointestinal: Positive for abdominal pain and vomiting. Negative for diarrhea and nausea.   Endocrine: Negative for cold intolerance and heat intolerance.   Genitourinary: Negative for dysuria and flank pain.   Musculoskeletal: Negative for back pain and gait problem.   Skin: Negative for pallor and rash.   Allergic/Immunologic: Negative for immunocompromised state.   Neurological: Negative for dizziness, weakness and headaches.   Hematological: Negative for adenopathy.   Psychiatric/Behavioral: Negative for agitation and confusion.        All other systems reviewed and are negative.     Personal History     Past Medical History:   Diagnosis Date   • Alcoholism (CMS/HCC)    • Cancer (CMS/HCC)     \"TONGUE CANCER\"   • Hypertension        Past Surgical History:   Procedure Laterality Date   • TOTAL HIP ARTHROPLASTY         Family History: family history is not on file. Otherwise pertinent FHx was reviewed and unremarkable.     Social History:  reports that she has quit smoking. She does not have any smokeless tobacco history on file. She reports that she does not use drugs.  Social History     Social History Narrative   • Not on file       Medications:  Available home medication information reviewed.    (Not in a hospital admission)    Allergies   Allergen Reactions   • Fosamax [Alendronate Sodium] Rash       Objective   Objective     Vital Signs:   Temp:  [98.1 °F (36.7 °C)] 98.1 °F (36.7 °C)  Heart Rate:  [88-94] 92  Resp:  [16] 16  BP: ()/() 144/92        Physical Exam   Constitutional: Awake, alert, chachectic  Eyes: PERRLA, sclerae anicteric, no conjunctival injection  HENT: NCAT, mucous membranes dry  Neck: " Supple, no thyromegaly, no lymphadenopathy, trachea midline  Respiratory: Clear to auscultation bilaterally, nonlabored respirations   Cardiovascular: RRR, no murmurs, rubs, or gallops, palpable pedal pulses bilaterally  Gastrointestinal: Positive bowel sounds, soft, TTP to LLQ  Musculoskeletal: No bilateral ankle edema, no clubbing or cyanosis to extremities  Psychiatric: Appropriate affect, cooperative  Neurologic: Oriented x 3, strength symmetric in all extremities, Cranial Nerves grossly intact to confrontation, speech clear  Skin: No rashes      Results Reviewed:  I have personally reviewed current lab and radiology data.    Results from last 7 days   Lab Units 05/04/20  1829   WBC 10*3/mm3 11.50*   HEMOGLOBIN g/dL 10.6*   HEMATOCRIT % 33.3*   PLATELETS 10*3/mm3 464*     Results from last 7 days   Lab Units 05/04/20  2223 05/04/20  1829   SODIUM mmol/L  --  142   POTASSIUM mmol/L  --  4.4   CHLORIDE mmol/L  --  102   CO2 mmol/L  --  26.0   BUN mg/dL  --  32*   CREATININE mg/dL  --  1.32*   GLUCOSE mg/dL  --  131*   CALCIUM mg/dL  --  9.8   ALT (SGPT) U/L  --  23   AST (SGOT) U/L  --  20   TROPONIN T ng/mL  --  <0.010   PROBNP pg/mL  --  2,622.0*   LACTATE mmol/L 1.0  --    PROCALCITONIN ng/mL 0.08*  --      Estimated Creatinine Clearance: 26.8 mL/min (A) (by C-G formula based on SCr of 1.32 mg/dL (H)).  Brief Urine Lab Results  (Last result in the past 365 days)      Color   Clarity   Blood   Leuk Est   Nitrite   Protein   CREAT   Urine HCG        05/04/20 1953 Yellow Clear Negative Negative Negative 30 mg/dL (1+)             Imaging Results (Last 24 Hours)     Procedure Component Value Units Date/Time    CT Head Without Contrast [[id number]] Collected:  05/05/20 0006     Updated:  05/05/20 0009    Narrative:       CT Head WO    HISTORY:   Confusion and headache today.    TECHNIQUE:   Axial unenhanced head CT with coronal reformats. Radiation dose reduction techniques included automated exposure control or  exposure modulation based on body size. Count of known CT and cardiac nuc med studies performed in previous 12 months: 0.     COMPARISON:   None.    FINDINGS:   There is generalized atrophy. Ventricular size and configuration are within normal limits. Chronic small vessel ischemic changes are present in the white matter. Atherosclerotic calcifications are noted in the vertebral arteries and carotid siphons. No  acute infarct or hemorrhage is seen. There are no masses. There is no skull fracture.  Visualized paranasal sinuses and mastoid air cells are clear.      Impression:       Senescent changes without acute abnormality.    Signer Name: Colt Pemberton MD   Signed: 5/5/2020 12:06 AM   Workstation Name: [facility]    [facility]    CT Chest Without Contrast [[id number]] Collected:  05/05/20 0001     Updated:  05/05/20 0003    Narrative:       CT Chest WO    INDICATION:   Confusion and headache. Possible pneumonia.    TECHNIQUE:   CT of the thorax without IV contrast. Coronal and sagittal reconstructions were obtained.  Radiation dose reduction techniques included automated exposure control or exposure modulation based on body size. Count of known CT and cardiac nuc med studies  performed in previous 12 months: 0.     COMPARISON:   None available.    FINDINGS:  Dextroscoliosis noted at the thoracolumbar junction. There is atherosclerotic disease and coronary artery disease. No pleural or pericardial effusion is seen. There is no adenopathy. There is essentially complete left lower lobe atelectasis which could  be a chronic long-standing finding. Comparison with any outside prior to be useful. There is some mild scarring or atelectasis in the lingula. There is also some right upper lobe scarring. No convincing evidence of acute pneumonia at this time. Upper  abdomen is remarkable for atherosclerotic disease.      Impression:         1. Essentially complete left lower lobe atelectasis, possibly  a chronic finding.  2. No CT findings present to indicate pneumonia. Note: CT may be negative in the earliest stages of COVID-19.    Signer Name: Colt Pemberton MD   Signed: 5/5/2020 12:01 AM   Workstation Name: [email or web address]    [facility]             Assessment/Plan   Assessment & Plan     Active Hospital Problems    Diagnosis POA   • **AMS (altered mental status) [R41.82] Yes     Priority: High   • LIDA (acute kidney injury) (CMS/HCC) [N17.9] Yes     Priority: High   • Leukocytosis [D72.829] Yes     Priority: High   • Intractable nausea and vomiting [R11.2] Yes     Priority: High   • Abdominal pain [R10.9] Yes     Priority: High   • Essential hypertension [I10] Yes     Priority: Medium   • Alcoholism (CMS/[facility]) [F10.20] Yes     Priority: Medium   • Anemia [D64.9] Yes     Priority: Medium   • COPD (chronic obstructive pulmonary disease) (CMS/[facility]) [J44.9] Yes     Priority: Medium       1. AMS:  - with LLQ abdominal pain  - CT head negative. History of alcoholism but denies recent alcohol use since admitted to [facility] a month ago  - check ethanol level  - banana bag x 3 days  - nutrition consult  - neuro checks  - will obtain CT A/P  - pain control and nausea control as needed  - am labs    2. LIDA:  - no previous chemistry to compare.   - hydration with IV fluids  - avoid nephrotoxins    3. COPD:  - scheduled duo nebs with additional pulmonary toilet as needed    4. Hypertension:  - hold lisinopril. Continue coreg      DVT prophylaxis:  mechanical      Admission Communication  Due to current limited visitation policies, an attempt will be made to update patient's identified best point-of-contact(s) at admission to discuss plan of care.   Contact: bang brunner   Relation: roomate   Time of communication: 0230   Notes (if applicable): patients roommate and caregiver noted change in MS         CODE STATUS:  Full code  Code Status and Medical Interventions:   Ordered at: 05/05/20 0120     Level Of  Support Discussed With:    Patient     Code Status:    CPR     Medical Interventions (Level of Support Prior to Arrest):    Full       Admission Status:  I believe this patient meets OBSERVATION status, however if further evaluation or treatment plans warrant, status may change.  Based upon current information, I predict patient's care encounter to be less than or equal to 2 midnights.        Electronically signed by Maxwell Valerio PA-C, 05/05/20, 1:26 AM.        COVID-19 RISK SCREEN     Has the patient had close contact without PPE with a lab confirmed COVID-19 (+) person or a person under investigation (PUI) for COVID-19 infection?  -- No     Has the patient had respiratory symptoms, worsened/new cough and/or SOA, unexplained fever, or sudden loss of smell and/or taste in the past 7 days? --  No    Does the patient have baseline higher exposure risk such as working in healthcare field, currently residing in healthcare facility, or ongoing hemodialysis?  --  No           Attending   Admission Attestation       I have seen and examined the patient, performing an independent face-to-face diagnostic evaluation with plan of care reviewed and developed with the advanced practice clinician (APC).      Brief Summary Statement:   Emily Cevallos is a 70 y.o. female with past medical history of oral cancer involving her tongue, alcoholism, COPD, anemia, hypertension who presents to the emergency department today after her roommate noted altered mental status.      Apparently, patient had mechanical fall on 4/4/2020 and had ORIF performed by Dr. Vicente on 4/5/2020 at [facility].  Patient was then sent to [facility] for rehab for 2 weeks.  She was subsequently discharged home but unfortunately became confused and agitated and was admitted again to [facility] from 4/26 to 5/1.  Etiology for her altered mental status was undetermined and patient returned to her baseline mental function.    Her roommate reports that patient  began having increasing confusion with nonbloody emesis last night.  She is otherwise been in her normal state of health over the last few days with no complaints of fever, cough, shortness of air, chest pain, diarrhea, abdominal pain, or any other symptoms.    While in the ER, patient is noted to be intermittently confused, but otherwise oriented.  She reports some mild abdominal pain earlier in the day, however now has resolved.  She cannot recall the events of last night.  Of note, patient was positive for cocaine and THC and admission to [facility].  It is unclear if this was false positive.  Patient denies abuse.    Remainder of detailed HPI is as noted by APC and has been reviewed and/or edited by me for completeness.    Attending Physical Exam:  Constitutional: Awake, alert, intermittently confused, nontoxic  Eyes: PERRLA, sclerae anicteric, no conjunctival injection  HENT: NCAT, mucous membranes moist  Neck: Supple, no thyromegaly, no lymphadenopathy, trachea midline  Respiratory: Clear to auscultation bilaterally, nonlabored respirations   Cardiovascular: RRR, no murmurs, rubs, or gallops, palpable pedal pulses bilaterally  Gastrointestinal: Positive bowel sounds, soft, nontender, nondistended  Musculoskeletal: No bilateral ankle edema, no clubbing or cyanosis to extremities  Psychiatric: Appropriate affect, cooperative  Neurologic: Oriented x 3, strength symmetric in all extremities, Cranial Nerves grossly intact to confrontation, speech clear  Skin: No rashes, incision well healed      Brief Assessment/Plan :  See detailed assessment and plan developed with APC which I have reviewed and/or edited for completeness.    Electronically signed by Jacques Sampson DO, 05/05/20, 3:10 AM.

## 2020-05-05 NOTE — ED PROVIDER NOTES
Subjective   Emily Cevallos is a 70 y.o. female who presents to the ED with complaints of altered mental status. The majority of history was obtained from the patient's roommate of 50+ years and her friend and POV, Tara Bear, who states that the patient goes by Julianne and has been with her for a very long time. Per the friend, the patient's baseline mentation is normal and competent. On 4/4/2020 the patient suffered a fall in which she fractured her left hip. Subsequently, she had hip surgery on 4/5/2020 with Dr. Vicente, orthopedics. Status post the surgery the patient was transferred from [place] to [facility] where she resided for 2 weeks prior to being discharged home. Upon her arrival home, the patient did really well and had good nutrition, decreased need for pain medication, and was eliminating her need for her walker when she suddenly became confused and agitated resulting in her re-admission to [place] from 4/26/2020 through 5/1/2020. The patient was discharged home on 5/1/2020 with residual generalized weakness but normal mentation/orientation. Since then, she has had good nutrition and very little pain. However, yesterday the patient once again became confused and agitated. Last night, she developed 5-6 episodes of emesis and dry heaving. Upon arrival to the ED tonight, the patient endorses mild hip pain s/p her surgery, however, she has no recollection of her illness last night. She states that her friend informed her that she had been vomiting. She denied any abdominal pain or diarrhea. She has not suffered any new falls. The patient's Amlodipine was discontinued 2-3 months ago secondary to hypotension and the patient has done well since then. She also uses an inhaler daily for her COPD. Additionally, she has a history of kidney disease, renal insuffiencey, HTN, old fracture of her right wrist, and tongue cancer. Her past surgical history includes removal of a portion of her tongue 8 years ago  "secondary to the cancer. Her PCP is Dr. Galvez. There are no other acute complaints at this time.      History provided by:  Patient and friend (and roommate/POV)  History limited by:  Mental status change  Altered Mental Status   Presenting symptoms: confusion    Severity:  Moderate  Most recent episode:  Yesterday  Episode history:  Continuous  Duration:  10 days  Timing:  Intermittent  Progression:  Unchanged  Chronicity:  New  Associated symptoms: agitation and vomiting    Associated symptoms: no abdominal pain and no decreased appetite        Review of Systems   Unable to perform ROS: Mental status change   Constitutional: Negative for decreased appetite.   Gastrointestinal: Positive for vomiting. Negative for abdominal pain and diarrhea.   Musculoskeletal: Positive for arthralgias (left hip pain).   Psychiatric/Behavioral: Positive for agitation and confusion.       Past Medical History:   Diagnosis Date   • Alcoholism (CMS/HCC)    • Cancer (CMS/HCC)     \"TONGUE CANCER\"   • Hypertension        Allergies   Allergen Reactions   • Fosamax [Alendronate Sodium] Rash       Past Surgical History:   Procedure Laterality Date   • TOTAL HIP ARTHROPLASTY         History reviewed. No pertinent family history.    Social History     Socioeconomic History   • Marital status:      Spouse name: Not on file   • Number of children: Not on file   • Years of education: Not on file   • Highest education level: Not on file   Tobacco Use   • Smoking status: Former Smoker   • Tobacco comment: QUIT 1 MONTH AGO    Substance and Sexual Activity   • Drug use: Never         Objective   Physical Exam   Constitutional: She is oriented to person, place, and time. Vital signs are normal. She appears well-developed and well-nourished.   Pleasantly confused. Answers questions appropriately. Normal vital signs.   HENT:   Head: Normocephalic and atraumatic.   Nose: Nose normal.   Eyes: Conjunctivae are normal. No scleral icterus.   Neck: " Normal range of motion. Neck supple.   Cardiovascular: Normal rate, regular rhythm and normal heart sounds.   No murmur heard.  Pulmonary/Chest: Effort normal and breath sounds normal. No respiratory distress.   Abdominal: Soft. She exhibits no distension. There is no tenderness.   Soft. Non tender.   Musculoskeletal: Normal range of motion. She exhibits no edema, tenderness or deformity.   Pelvis is stable. Full passive ROM of hips, knees, and ankles without production of pain.   Neurological: She is alert and oriented to person, place, and time.   Pleasantly confused. Oriented to person, place, and time, however, has no recollection of being ill and can only tell us what was relayed to her by her friend. Answers questions appropriately.   Skin: Skin is warm and dry.   Psychiatric: She has a normal mood and affect. Her behavior is normal.   Nursing note and vitals reviewed.      Procedures         ED Course  ED Course as of May 05 0055   Mon May 04, 2020   2232 proBNP(!): 2,622.0 [MS]   Tue May 05, 2020   0030 Patient's work-up is remarkable for stable renal insufficiency.  She has had normal vital signs throughout her ED course.  She is received a modest bolus of IV fluids.  She is awake and alert and continues to maintain that she feels nauseated without any vomiting during her ED course.  I received medical records from [facility] which mentions likely etiologies of altered mental status to be dehydration and substance use including cannabinoids and alcohol.  Patient is pleasant and without complaint this evening.  She denies any use of any substances other than her pain medicines today, recalling that she has had 2 half pills through the duration of the day to her recollection.  I have discussed her case with the hospitalist, Dr. Sampson who concurs with inpatient care.  Patient understands and concurs with plan of care.    [MS]   0053 I have had another follow-up phone call with the patient's POA,  Tara Bear who is aware of the patient's disposition to inpatient care and is grateful for the phone call and will inquire tomorrow about her friend    [MS]      ED Course User Index  [MS] Cici Duttonprasanna Andrade, CRISTO     Recent Results (from the past 24 hour(s))   Comprehensive Metabolic Panel    Collection Time: 05/04/20  6:29 PM   Result Value Ref Range    Glucose 131 (H) 65 - 99 mg/dL    BUN 32 (H) 8 - 23 mg/dL    Creatinine 1.32 (H) 0.57 - 1.00 mg/dL    Sodium 142 136 - 145 mmol/L    Potassium 4.4 3.5 - 5.2 mmol/L    Chloride 102 98 - 107 mmol/L    CO2 26.0 22.0 - 29.0 mmol/L    Calcium 9.8 8.6 - 10.5 mg/dL    Total Protein 7.7 6.0 - 8.5 g/dL    Albumin 4.10 3.50 - 5.20 g/dL    ALT (SGPT) 23 1 - 33 U/L    AST (SGOT) 20 1 - 32 U/L    Alkaline Phosphatase 84 39 - 117 U/L    Total Bilirubin 0.2 0.2 - 1.2 mg/dL    eGFR Non African Amer 40 (L) >60 mL/min/1.73    Globulin 3.6 gm/dL    A/G Ratio 1.1 g/dL    BUN/Creatinine Ratio 24.2 7.0 - 25.0    Anion Gap 14.0 5.0 - 15.0 mmol/L   Lipase    Collection Time: 05/04/20  6:29 PM   Result Value Ref Range    Lipase 36 13 - 60 U/L   Light Blue Top    Collection Time: 05/04/20  6:29 PM   Result Value Ref Range    Extra Tube hold for add-on    Green Top (Gel)    Collection Time: 05/04/20  6:29 PM   Result Value Ref Range    Extra Tube Hold for add-ons.    Lavender Top    Collection Time: 05/04/20  6:29 PM   Result Value Ref Range    Extra Tube hold for add-on    Gold Top - SST    Collection Time: 05/04/20  6:29 PM   Result Value Ref Range    Extra Tube Hold for add-ons.    CBC Auto Differential    Collection Time: 05/04/20  6:29 PM   Result Value Ref Range    WBC 11.50 (H) 3.40 - 10.80 10*3/mm3    RBC 3.28 (L) 3.77 - 5.28 10*6/mm3    Hemoglobin 10.6 (L) 12.0 - 15.9 g/dL    Hematocrit 33.3 (L) 34.0 - 46.6 %    .5 (H) 79.0 - 97.0 fL    MCH 32.3 26.6 - 33.0 pg    MCHC 31.8 31.5 - 35.7 g/dL    RDW 12.1 (L) 12.3 - 15.4 %    RDW-SD 45.1 37.0 - 54.0 fl    MPV 9.6 6.0 - 12.0 fL     Platelets 464 (H) 140 - 450 10*3/mm3    Neutrophil % 67.5 42.7 - 76.0 %    Lymphocyte % 20.2 19.6 - 45.3 %    Monocyte % 9.7 5.0 - 12.0 %    Eosinophil % 1.7 0.3 - 6.2 %    Basophil % 0.6 0.0 - 1.5 %    Immature Grans % 0.3 0.0 - 0.5 %    Neutrophils, Absolute 7.76 (H) 1.70 - 7.00 10*3/mm3    Lymphocytes, Absolute 2.32 0.70 - 3.10 10*3/mm3    Monocytes, Absolute 1.12 (H) 0.10 - 0.90 10*3/mm3    Eosinophils, Absolute 0.19 0.00 - 0.40 10*3/mm3    Basophils, Absolute 0.07 0.00 - 0.20 10*3/mm3    Immature Grans, Absolute 0.04 0.00 - 0.05 10*3/mm3    nRBC 0.0 0.0 - 0.2 /100 WBC   Troponin    Collection Time: 05/04/20  6:29 PM   Result Value Ref Range    Troponin T <0.010 0.000 - 0.030 ng/mL   BNP    Collection Time: 05/04/20  6:29 PM   Result Value Ref Range    proBNP 2,622.0 (H) 5.0 - 900.0 pg/mL   Urinalysis With Microscopic If Indicated (No Culture) - Urine, Clean Catch    Collection Time: 05/04/20  7:53 PM   Result Value Ref Range    Color, UA Yellow Yellow, Straw    Appearance, UA Clear Clear    pH, UA 7.0 5.0 - 8.0    Specific Gravity, UA 1.015 1.001 - 1.030    Glucose, UA Negative Negative    Ketones, UA Negative Negative    Bilirubin, UA Negative Negative    Blood, UA Negative Negative    Protein, UA 30 mg/dL (1+) (A) Negative    Leuk Esterase, UA Negative Negative    Nitrite, UA Negative Negative    Urobilinogen, UA 0.2 E.U./dL 0.2 - 1.0 E.U./dL   Urinalysis, Microscopic Only - Urine, Clean Catch    Collection Time: 05/04/20  7:53 PM   Result Value Ref Range    RBC, UA 0-2 None Seen, 0-2 /HPF    WBC, UA 0-2 None Seen, 0-2 /HPF    Bacteria, UA None Seen None Seen, Trace /HPF    Squamous Epithelial Cells, UA None Seen None Seen, 0-2 /HPF    Hyaline Casts, UA None Seen 0 - 6 /LPF    Methodology Automated Microscopy    Urine Drug Screen - Urine, Clean Catch    Collection Time: 05/04/20  7:53 PM   Result Value Ref Range    THC, Screen, Urine Negative Negative    Phencyclidine (PCP), Urine Negative Negative     Cocaine Screen, Urine Negative Negative    Methamphetamine, Ur Negative Negative    Opiate Screen Positive (A) Negative    Amphetamine Screen, Urine Negative Negative    Benzodiazepine Screen, Urine Negative Negative    Tricyclic Antidepressants Screen Negative Negative    Methadone Screen, Urine Negative Negative    Barbiturates Screen, Urine Negative Negative    Oxycodone Screen, Urine Negative Negative    Propoxyphene Screen Negative Negative    Buprenorphine, Screen, Urine Negative Negative   Lactic Acid, Plasma    Collection Time: 05/04/20 10:23 PM   Result Value Ref Range    Lactate 1.0 0.5 - 2.0 mmol/L   CK    Collection Time: 05/04/20 10:23 PM   Result Value Ref Range    Creatine Kinase 91 20 - 180 U/L     Note: In addition to lab results from this visit, the labs listed above may include labs taken at another facility or during a different encounter within the last 24 hours. Please correlate lab times with ED admission and discharge times for further clarification of the services performed during this visit.    CT Head Without Contrast   Final Result   Senescent changes without acute abnormality.      Signer Name: Colt Pemberton MD    Signed: 5/5/2020 12:06 AM    Workstation Name: [facility]     Radiology [facility]      CT Chest Without Contrast   Final Result      1. Essentially complete left lower lobe atelectasis, possibly a chronic finding.   2. No CT findings present to indicate pneumonia. Note: CT may be negative in the earliest stages of COVID-19.      Signer Name: Colt Pemberton MD    Signed: 5/5/2020 12:01 AM    Workstation Name: [facility]        Vitals:    05/04/20 1900 05/04/20 1940 05/04/20 2000 05/04/20 2100   BP: (!) 145/102 167/97 176/86 144/92   Patient Position:       Pulse: 90 88 90 92   Resp:  16     Temp:       TempSrc:       SpO2: 94% 94% 93% 94%   Weight:       Height:         Medications   sodium chloride 0.9 % flush 10 mL  (has no administration in time range)   multiple vitamin (M.V.I. Adult) 10 mL, thiamine (B-1) 100 mg, folic acid 1 mg in lactated ringers 1,000 mL infusion (has no administration in time range)   sodium chloride 0.9 % bolus 500 mL (500 mL Intravenous New Bag 5/4/20 2104)   ondansetron (ZOFRAN) injection 4 mg (4 mg Intravenous Given 5/4/20 2151)     ECG/EMG Results (last 24 hours)     Procedure Component Value Units Date/Time    ECG 12 Lead [[id number]] Collected:  05/04/20 2208     Updated:  05/04/20 2208        ECG 12 Lead               COVID-19 RISK SCREEN     1. Has the patient had close contact without PPE with a lab confirmed COVID-19 (+) person or a person under investigation (PUI) for COVID-19 infection?  -- No     2. Has the patient had respiratory symptoms, worsened/new cough and/or SOA, unexplained fever, or sudden loss of smell and/or taste in the past 7 days? --  No    3. Does the patient have baseline higher exposure risk such as working in healthcare field or currently residing in healthcare facility?  --  No                 MDM    Final diagnoses:   Altered mental status, unspecified altered mental status type   Renal insufficiency   History of hyperlipidemia   History of hypertension   Status post closed fracture of left hip       Documentation assistance provided by liam Gatica.  Information recorded by the scribe was done at my direction and has been verified and validated by me.     Vanessa Gatica  05/05/20 0009       Zully Dutton APRN  05/05/20 0055

## 2020-05-05 NOTE — PLAN OF CARE
Problem: Fall Risk (Adult)  Goal: Identify Related Risk Factors and Signs and Symptoms  Outcome: Ongoing (interventions implemented as appropriate)  Note:   Vss, roomair, pat is alert, purewick in place (pt doesn't seem to know when voiding at times) , ambulated in hallway with assist x 2: walker and gaitbelt to help with IV pole, no c/o waffle mattress on bed, pat complained of bottom hurting, mattress has helped, no skin issues, tolerating diet, BM this shift, will cont to monitor IS 1700,

## 2020-05-06 LAB
ANION GAP SERPL CALCULATED.3IONS-SCNC: 12 MMOL/L (ref 5–15)
BUN BLD-MCNC: 26 MG/DL (ref 8–23)
BUN/CREAT SERPL: 21.5 (ref 7–25)
CALCIUM SPEC-SCNC: 9.2 MG/DL (ref 8.6–10.5)
CHLORIDE SERPL-SCNC: 99 MMOL/L (ref 98–107)
CO2 SERPL-SCNC: 24 MMOL/L (ref 22–29)
CREAT BLD-MCNC: 1.21 MG/DL (ref 0.57–1)
DEPRECATED RDW RBC AUTO: 43.4 FL (ref 37–54)
ERYTHROCYTE [DISTWIDTH] IN BLOOD BY AUTOMATED COUNT: 11.8 % (ref 12.3–15.4)
GFR SERPL CREATININE-BSD FRML MDRD: 44 ML/MIN/1.73
GLUCOSE BLD-MCNC: 83 MG/DL (ref 65–99)
HCT VFR BLD AUTO: 31.8 % (ref 34–46.6)
HGB BLD-MCNC: 10.1 G/DL (ref 12–15.9)
MCH RBC QN AUTO: 32 PG (ref 26.6–33)
MCHC RBC AUTO-ENTMCNC: 31.8 G/DL (ref 31.5–35.7)
MCV RBC AUTO: 100.6 FL (ref 79–97)
PLATELET # BLD AUTO: 437 10*3/MM3 (ref 140–450)
PMV BLD AUTO: 9.9 FL (ref 6–12)
POTASSIUM BLD-SCNC: 4.2 MMOL/L (ref 3.5–5.2)
RBC # BLD AUTO: 3.16 10*6/MM3 (ref 3.77–5.28)
SODIUM BLD-SCNC: 135 MMOL/L (ref 136–145)
WBC NRBC COR # BLD: 9.32 10*3/MM3 (ref 3.4–10.8)

## 2020-05-06 PROCEDURE — 97110 THERAPEUTIC EXERCISES: CPT

## 2020-05-06 PROCEDURE — 97116 GAIT TRAINING THERAPY: CPT

## 2020-05-06 PROCEDURE — 99225 PR SBSQ OBSERVATION CARE/DAY 25 MINUTES: CPT | Performed by: INTERNAL MEDICINE

## 2020-05-06 PROCEDURE — 85027 COMPLETE CBC AUTOMATED: CPT | Performed by: INTERNAL MEDICINE

## 2020-05-06 PROCEDURE — 25010000002 HEPARIN (PORCINE) PER 1000 UNITS: Performed by: INTERNAL MEDICINE

## 2020-05-06 PROCEDURE — 94799 UNLISTED PULMONARY SVC/PX: CPT

## 2020-05-06 PROCEDURE — G0378 HOSPITAL OBSERVATION PER HR: HCPCS

## 2020-05-06 PROCEDURE — 96372 THER/PROPH/DIAG INJ SC/IM: CPT

## 2020-05-06 PROCEDURE — 25010000002 THIAMINE PER 100 MG: Performed by: INTERNAL MEDICINE

## 2020-05-06 PROCEDURE — 96366 THER/PROPH/DIAG IV INF ADDON: CPT

## 2020-05-06 PROCEDURE — 80048 BASIC METABOLIC PNL TOTAL CA: CPT | Performed by: INTERNAL MEDICINE

## 2020-05-06 PROCEDURE — 97535 SELF CARE MNGMENT TRAINING: CPT

## 2020-05-06 PROCEDURE — 94640 AIRWAY INHALATION TREATMENT: CPT

## 2020-05-06 PROCEDURE — 97166 OT EVAL MOD COMPLEX 45 MIN: CPT

## 2020-05-06 RX ORDER — BUDESONIDE AND FORMOTEROL FUMARATE DIHYDRATE 160; 4.5 UG/1; UG/1
2 AEROSOL RESPIRATORY (INHALATION)
Status: DISCONTINUED | OUTPATIENT
Start: 2020-05-06 | End: 2020-05-07 | Stop reason: HOSPADM

## 2020-05-06 RX ORDER — FLUTICASONE PROPIONATE 50 MCG
2 SPRAY, SUSPENSION (ML) NASAL DAILY
Status: DISCONTINUED | OUTPATIENT
Start: 2020-05-07 | End: 2020-05-07 | Stop reason: HOSPADM

## 2020-05-06 RX ADMIN — FLUTICASONE PROPIONATE 2 SPRAY: 50 SPRAY, METERED NASAL at 21:49

## 2020-05-06 RX ADMIN — CARVEDILOL 12.5 MG: 12.5 TABLET, FILM COATED ORAL at 17:29

## 2020-05-06 RX ADMIN — ROSUVASTATIN CALCIUM 10 MG: 10 TABLET, COATED ORAL at 08:29

## 2020-05-06 RX ADMIN — THIAMINE HYDROCHLORIDE 100 MG: 100 INJECTION, SOLUTION INTRAMUSCULAR; INTRAVENOUS at 08:29

## 2020-05-06 RX ADMIN — CARVEDILOL 12.5 MG: 12.5 TABLET, FILM COATED ORAL at 08:30

## 2020-05-06 RX ADMIN — MELATONIN TAB 5 MG 5 MG: 5 TAB at 21:48

## 2020-05-06 RX ADMIN — ASPIRIN 81 MG 81 MG: 81 TABLET ORAL at 08:30

## 2020-05-06 RX ADMIN — HEPARIN SODIUM 5000 UNITS: 5000 INJECTION, SOLUTION INTRAVENOUS; SUBCUTANEOUS at 21:49

## 2020-05-06 RX ADMIN — ACETAMINOPHEN 650 MG: 325 TABLET, FILM COATED ORAL at 21:48

## 2020-05-06 RX ADMIN — BUDESONIDE AND FORMOTEROL FUMARATE DIHYDRATE 2 PUFF: 160; 4.5 AEROSOL RESPIRATORY (INHALATION) at 20:48

## 2020-05-06 RX ADMIN — MONTELUKAST SODIUM 10 MG: 10 TABLET, COATED ORAL at 21:48

## 2020-05-06 RX ADMIN — CETIRIZINE HYDROCHLORIDE 5 MG: 10 TABLET, FILM COATED ORAL at 08:30

## 2020-05-06 RX ADMIN — SODIUM CHLORIDE, PRESERVATIVE FREE 10 ML: 5 INJECTION INTRAVENOUS at 21:49

## 2020-05-06 RX ADMIN — HEPARIN SODIUM 5000 UNITS: 5000 INJECTION, SOLUTION INTRAVENOUS; SUBCUTANEOUS at 08:30

## 2020-05-06 NOTE — THERAPY EVALUATION
"Acute Care - Occupational Therapy Initial Evaluation  [facility]     Patient Name: Emily Cevallos  : 1950  MRN: [id number]  Today's Date: 2020     Date of Referral to OT: 20       Admit Date: 2020       ICD-10-CM ICD-9-CM   1. Altered mental status, unspecified altered mental status type R41.82 780.97   2. Renal insufficiency N28.9 593.9   3. History of hyperlipidemia Z86.39 V12.29   4. History of hypertension Z86.79 V12.59   5. Status post closed fracture of left hip Z87.81 V15.51   6. Impaired mobility and ADLs Z74.09 799.89     Patient Active Problem List   Diagnosis   • Essential hypertension   • Alcoholism (CMS/HCC)   • AMS (altered mental status)   • LIDA (acute kidney injury) (CMS/HCC)   • Anemia   • Leukocytosis   • COPD (chronic obstructive pulmonary disease) (CMS/HCC)   • Intractable nausea and vomiting   • Abdominal pain     Past Medical History:   Diagnosis Date   • Alcoholism (CMS/HCC)    • Cancer (CMS/HCC)     \"TONGUE CANCER\"   • Hypertension      Past Surgical History:   Procedure Laterality Date   • TOTAL HIP ARTHROPLASTY            OT ASSESSMENT FLOWSHEET (last 12 hours)      Occupational Therapy Evaluation     Row Name 20 1000                   OT Evaluation Time/Intention    Subjective Information  complains of;weakness;pain  -KF        Document Type  evaluation  -KF        Mode of Treatment  occupational therapy  -KF        Patient Effort  excellent  -KF        Symptoms Noted During/After Treatment  none  -KF           General Information    Patient Profile Reviewed?  yes  -KF        Prior Level of Function  independent:;transfer;bed mobility;ADL's;all household mobility  -KF        Equipment Currently Used at Home  grab bar;ramp;walker, rolling  -KF        Existing Precautions/Restrictions  fall recent ORIF LLE  -KF        Risks Reviewed  patient:;LOB;nausea/vomiting;dizziness;change in vital signs  -KF        Benefits Reviewed  patient:;improve function;increase " independence;increase strength;increase balance;increase knowledge  -KF        Barriers to Rehab  none identified  -KF           Relationship/Environment    Primary Source of Support/Comfort  friend  -KF        Lives With  friend(s)  -KF        Family Caregiver if Needed  friend(s)  -KF           Resource/Environmental Concerns    Current Living Arrangements  home/apartment/condo  -KF           Cognitive Assessment/Interventions    Additional Documentation  Cognitive Assessment/Intervention (Group)  -KF           Cognitive Assessment/Intervention- PT/OT    Affect/Mental Status (Cognitive)  WNL  -KF        Orientation Status (Cognition)  oriented x 4  -KF        Follows Commands (Cognition)  WFL;follows one step commands;over 90% accuracy  -KF        Cognitive Function (Cognitive)  safety deficit  -KF        Safety Deficit (Cognitive)  mild deficit;insight into deficits/self awareness;safety precautions awareness  -KF        Cognitive Interventions (Cognitive)  occupation/activity based interventions;process/task specific training  -KF           Safety Issues, Functional Mobility    Safety Issues Affecting Function (Mobility)  insight into deficits/self awareness;safety precaution awareness  -KF        Impairments Affecting Function (Mobility)  balance;endurance/activity tolerance;strength  -KF           Bed Mobility Assessment/Treatment    Bed Mobility Assessment/Treatment  sit-supine  -KF        Sit-Supine [place] (Bed Mobility)  supervision;verbal cues  -KF        Bed Mobility, Safety Issues  decreased use of legs for bridging/pushing  -KF        Assistive Device (Bed Mobility)  bed rails;head of bed elevated  -KF           Functional Mobility    Functional Mobility- Ind. Level  contact guard assist;verbal cues required  -KF        Functional Mobility- Device  rolling walker  -KF        Functional Mobility-Distance (Feet)  10  -KF        Functional Mobility- Safety Issues  step length decreased  -KF         Functional Mobility- Comment  4+6 ft funct mob C then to bed   -KF           Transfer Assessment/Treatment    Transfer Assessment/Treatment  sit-stand transfer;stand-sit transfer;toilet transfer;chair-bed transfer  -KF        Comment (Transfers)  cues for HP   -KF           Bed-Chair Transfer    Bed-Chair [place] (Transfers)  contact guard;verbal cues  -KF        Assistive Device (Bed-Chair Transfers)  walker, front-wheeled  -KF           Chair-Bed Transfer    Chair-Bed [place] (Transfers)  contact guard;verbal cues  -KF        Assistive Device (Chair-Bed Transfers)  walker, front-wheeled  -KF           Sit-Stand Transfer    Sit-Stand [place] (Transfers)  contact guard;verbal cues  -KF        Assistive Device (Sit-Stand Transfers)  walker, front-wheeled  -KF           Stand-Sit Transfer    Stand-Sit [place] (Transfers)  contact guard;verbal cues  -KF        Assistive Device (Stand-Sit Transfers)  walker, front-wheeled  -KF           Toilet Transfer    Type (Toilet Transfer)  stand pivot/stand step  -KF        [place] Level (Toilet Transfer)  contact guard  -KF        Assistive Device (Toilet Transfer)  commode, bedside without drop arms;walker, front-wheeled  -KF           ADL Assessment/Intervention    29618 - OT Self Care/Mgmt Minutes  10  -KF        BADL Assessment/Intervention  toileting;lower body dressing;grooming  -KF           Lower Body Dressing Assessment/Training    Lower Body Dressing [place] Level  don;undergarment;contact guard assist;verbal cues  -KF        Lower Body Dressing Position  unsupported sitting  -KF        Comment (Lower Body Dressing)  from [facility] lower surface good demo L LE first without cues   -KF           Grooming Assessment/Training    [place] Level (Grooming)  wash face, hands;set up  -KF        Grooming Position  unsupported sitting  -KF           Toileting Assessment/Training    [place] Level (Toileting)  adjust/manage clothing;minimum  assist (75% patient effort);perform perineal hygiene;contact guard assist  -KF        Assistive Devices (Toileting)  commode, bedside without drop arms  -KF        Toileting Position  supported sitting;supported standing  -KF           BADL Safety/Performance    Impairments, BADL Safety/Performance  balance;endurance/activity tolerance;strength;trunk/postural control  -KF        Skilled BADL Treatment/Intervention  BADL process/adaptation training;cognitive/safety deficit modifications  -KF        Progress in BADL Status  improvement noted  -KF           General ROM    GENERAL ROM COMMENTS  BUE WNL   -KF           MMT (Manual Muscle Testing)    General MMT Comments  BUE grosslyl 4-/5   -KF           Motor Assessment/Interventions    Additional Documentation  Balance (Group);Balance Interventions (Group);Fine Motor Testing & Training (Group);Gross Motor Coordination (Group)  -KF           Gross Motor Coordination    Gross Motor Impairments  AROM (active range of motion);coordination  -KF        Gross Motor Skill, Impairments Detail  WFL  -KF           Balance    Balance  static sitting balance;static standing balance;dynamic sitting balance;dynamic standing balance  -KF           Static Sitting Balance    Level of [place] (Unsupported Sitting, Static Balance)  independent  -KF        Sitting Position (Unsupported Sitting, Static Balance)  sitting in chair;sitting on edge of bed  -KF           Dynamic Sitting Balance    Level of [place], Reaches Outside Midline (Sitting, Dynamic Balance)  supervision  -KF        Sitting Position, Reaches Outside Midline (Sitting, Dynamic Balance)  sitting on edge of bed  -KF           Static Standing Balance    Level of [place] (Supported Standing, Static Balance)  contact guard assist  -KF        Time Able to Maintain Position (Supported Standing, Static Balance)  1 to 2 minutes  -KF        Assistive Device Utilized (Supported Standing, Static Balance)  walker, rolling   -KF           Dynamic Standing Balance    Level of [place], Reaches Outside Midline (Standing, Dynamic Balance)  contact guard assist  -KF        Time Able to Maintain Position, Reaches Outside Midline (Standing, Dynamic Balance)  1 to 2 minutes  -KF        Assistive Device Utilized (Supported Standing, Dynamic Balance)  walker, rolling  -KF        Comment, Reaches Outside Midline (Standing, Dynamic Balance)  no LOB throughout   -KF           Fine Motor Testing & Training    Training Activity, Fine Motor Coordination  manipulation of grooming tools  -KF        Comment, Fine Motor Coordination  WFL   -KF           Sensory Assessment/Intervention    Sensory General Assessment  no sensation deficits identified  -KF           Positioning and Restraints    Pre-Treatment Position  sitting in chair/recliner  -KF        Post Treatment Position  bed  -KF        In Bed  notified nsg;supine;fowlers;call light within reach;encouraged to call for assist;exit alarm on;side rails up x2  -KF           Pain Assessment    Additional Documentation  Pain Scale: Numbers Pre/Post-Treatment (Group)  -KF           Pain Scale: Numbers Pre/Post-Treatment    Pain Scale: Numbers, Pretreatment  6/10  -KF        Pain Scale: Numbers, Post-Treatment  6/10  -KF        Pain Location - Side  Bilateral  -KF        Pain Location - Orientation  lower  -KF        Pain Location  back  -KF        Pre/Post Treatment Pain Comment  chronic per Pt, RN notified, Pt tolerated   -KF        Pain Intervention(s)  Repositioned;Ambulation/increased activity  -KF           Plan of Care Review    Plan of Care Reviewed With  patient  -KF        Progress  improving  -KF           Clinical Impression (OT)    Date of Referral to OT  05/05/20  -KF        OT Diagnosis  ADL decline   -KF        Patient/Family Goals Statement (OT Eval)  PLOF   -KF        Criteria for Skilled Therapeutic Interventions Met (OT Eval)  yes;treatment indicated  -KF        Rehab Potential (OT  Eval)  good, to achieve stated therapy goals  -KF        Therapy Frequency (OT Eval)  daily  -KF        Care Plan Review (OT)  evaluation/treatment results reviewed;care plan/treatment goals reviewed;risks/benefits reviewed;current/potential barriers reviewed;patient/other agree to care plan  -KF        Anticipated Equipment Needs at Discharge (OT)  -- TBD  -KF           Vital Signs    Pre Systolic BP Rehab  133 RN cleared VSS   -KF        Pre Treatment Diastolic BP  74  -KF        Pretreatment Heart Rate (beats/min)  93  -KF        Posttreatment Heart Rate (beats/min)  93  -KF        Pre SpO2 (%)  95  -KF        O2 Delivery Pre Treatment  room air  -KF        Post SpO2 (%)  95  -KF        O2 Delivery Post Treatment  room air  -KF        Pre Patient Position  Sitting  -KF        Intra Patient Position  Standing  -KF        Post Patient Position  Supine  -KF           Planned OT Interventions    Planned Therapy Interventions (OT Eval)  activity tolerance training;adaptive equipment training;BADL retraining;cognitive/visual perception retraining;functional balance retraining;IADL retraining;occupation/activity based interventions;neuromuscular control/coordination retraining;patient/caregiver education/training;ROM/therapeutic exercise;strengthening exercise;transfer/mobility retraining  -KF           OT Goals    Transfer Goal Selection (OT)  transfer, OT goal 1  -KF        Dressing Goal Selection (OT)  dressing, OT goal 1  -KF        Toileting Goal Selection (OT)  toileting, OT goal 1  -KF           Transfer Goal 1 (OT)    Activity/Assistive Device (Transfer Goal 1, OT)  commode;commode, bedside without drop arms;walker, rolling;bed-to-chair/chair-to-bed BSC elevation  -KF        [place] Level/Cues Needed (Transfer Goal 1, OT)  standby assist  -KF        Time Frame (Transfer Goal 1, OT)  long term goal (LTG);by discharge  -KF        Progress/Outcome (Transfer Goal 1, OT)  goal ongoing  -KF           Dressing  Goal 1 (OT)    Activity/Assistive Device (Dressing Goal 1, OT)  lower body dressing undergarment  -KF        [place]/Cues Needed (Dressing Goal 1, OT)  standby assist  -KF        Time Frame (Dressing Goal 1, OT)  long term goal (LTG);by discharge  -KF        Progress/Outcome (Dressing Goal 1, OT)  goal ongoing  -KF           Toileting Goal 1 (OT)    Activity/Device (Toileting Goal 1, OT)  adjust/manage clothing;perform perineal hygiene;commode;commode, bedside without drop arms;grab bar/safety frame BSC over commode for elevation  -KF        [place] Level/Cues Needed (Toileting Goal 1, OT)  verbal cues required;standby assist  -KF        Time Frame (Toileting Goal 1, OT)  long term goal (LTG)  -KF        Progress/Outcome (Toileting Goal 1, OT)  goal ongoing  -KF           Living Environment    Home Accessibility  other (see comments) ramp  -KF          User Key  (r) = Recorded By, (t) = Taken By, (c) = Cosigned By    Initials Name Effective Dates    Joan Ware, OT 04/03/18 -                OT Recommendation and Plan  Outcome Summary/Treatment Plan (OT)  Anticipated Equipment Needs at Discharge (OT): (TBD)  Planned Therapy Interventions (OT Eval): activity tolerance training, adaptive equipment training, BADL retraining, cognitive/visual perception retraining, functional balance retraining, IADL retraining, occupation/activity based interventions, neuromuscular control/coordination retraining, patient/caregiver education/training, ROM/therapeutic exercise, strengthening exercise, transfer/mobility retraining  Therapy Frequency (OT Eval): daily  Plan of Care Review  Plan of Care Reviewed With: patient  Plan of Care Reviewed With: patient  Outcome Summary: OT eval completed Pt presents with deficits in balance, strength, and activity tolerance for ADL completion, CGA STS and CGA steps chair to BSC then to bed no LOB throughout at FWW, CGA donning undergarment, SUP bed mob sitting to supine; recom  IPOT d/c home with assist and HHOT    Outcome Measures     Row Name 05/06/20 1000             How much help from another is currently needed...    Putting on and taking off regular lower body clothing?  3  -KF      Bathing (including washing, rinsing, and drying)  2  -KF      Toileting (which includes using toilet bed pan or urinal)  3  -KF      Putting on and taking off regular upper body clothing  3  -KF      Taking care of personal grooming (such as brushing teeth)  3  -KF      Eating meals  4  -KF      AM-PAC 6 Clicks Score (OT)  18  -KF         Functional Assessment    Outcome Measure Options  AM-PAC 6 Clicks Daily Activity (OT)  -KF        User Key  (r) = Recorded By, (t) = Taken By, (c) = Cosigned By    Initials Name Provider Type    Joan Ware OT Occupational Therapist          Time Calculation:   Time Calculation- OT     Row Name 05/06/20 1000             Time Calculation- OT    OT Start Time  1000  -KF      Total Timed Code Minutes- OT  10 minute(s)  -KF      OT Received On  05/06/20  -KF      OT Goal Re-Cert Due Date  05/16/20  -KF         Timed Charges    94715 - OT Self Care/Mgmt Minutes  10  -KF        User Key  (r) = Recorded By, (t) = Taken By, (c) = Cosigned By    Initials Name Provider Type    Joan Ware OT Occupational Therapist        Therapy Charges for Today     Code Description Service Date Service Provider Modifiers Qty    [id number]  OT SELF CARE/MGMT/TRAIN EA 15 MIN 5/6/2020 Joan Zhang OT GO 1    [id number]  OT EVAL MOD COMPLEXITY 3 5/6/2020 Joan Zhang OT GO 1               Joan Zhang OT  5/6/2020

## 2020-05-06 NOTE — PLAN OF CARE
Problem: Patient Care Overview  Goal: Plan of Care Review  Flowsheets (Taken 5/6/2020 0129)  Progress: improving  Plan of Care Reviewed With: patient  Outcome Summary: Performed STS x 2 trials from EOB as well as stand to sit in recliner w/ R wx & CGA, amb 32 ft w/ R wx & CGA, & performed LE ther exer & bal activ w/ mult rests d/t incr back pain & min. LLE discomfort; noted elev SBP, HR to 102, desat to 95% on RA, & fatigue

## 2020-05-06 NOTE — THERAPY TREATMENT NOTE
"Patient Name: Emily Cevallos  : 1950    MRN: [id number]                              Today's Date: 2020       Admit Date: 2020    Visit Dx:     ICD-10-CM ICD-9-CM   1. Altered mental status, unspecified altered mental status type R41.82 780.97   2. Renal insufficiency N28.9 593.9   3. History of hyperlipidemia Z86.39 V12.29   4. History of hypertension Z86.79 V12.59   5. Status post closed fracture of left hip Z87.81 V15.51   6. Impaired mobility and ADLs Z74.09 799.89     Patient Active Problem List   Diagnosis   • Essential hypertension   • Alcoholism (CMS/HCC)   • AMS (altered mental status)   • LIDA (acute kidney injury) (CMS/HCC)   • Anemia   • Leukocytosis   • COPD (chronic obstructive pulmonary disease) (CMS/HCC)   • Intractable nausea and vomiting   • Abdominal pain     Past Medical History:   Diagnosis Date   • Alcoholism (CMS/HCC)    • Cancer (CMS/HCC)     \"TONGUE CANCER\"   • Hypertension      Past Surgical History:   Procedure Laterality Date   • TOTAL HIP ARTHROPLASTY       General Information     Row Name 20 165          PT Evaluation Time/Intention    Document Type  therapy note (daily note)  -DM     Mode of Treatment  physical therapy  -DM     Row Name 20 165          General Information    Existing Precautions/Restrictions  fall recent fall & ORIF LLE fx; incont pad/mesh panty;ETOH   -DM       User Key  (r) = Recorded By, (t) = Taken By, (c) = Cosigned By    Initials Name Provider Type    DM Luisa South, PT Physical Therapist        Mobility     Row Name 20 165          Bed Mobility Assessment/Treatment    Bed Mobility Assessment/Treatment  supine-sit;scooting/bridging;rolling left PCT in to assess,take BP, Bring new pulse oxim sensor  -DM     Rolling Left [place] (Bed Mobility)  independent  -DM     Scooting/Bridging [place] (Bed Mobility)  independent  -DM     Supine-Sit [place] (Bed Mobility)  verbal cues;contact guard CGA for IV  -DM     " Assistive Device (Bed Mobility)  bed rails;head of bed elevated  -DM     Comment (Bed Mobility)  cued to utilize L bedrail  -DM     Row Name 05/06/20 1650          Transfer Assessment/Treatment    Comment (Transfers)  cues for Seq; init attempted to pull up w/ R wx; Hands placed back on EOB & 2nd attempt w/ CGA  -DM     Row Name 05/06/20 1650          Sit-Stand Transfer    Sit-Stand [place] (Transfers)  verbal cues;contact guard  -DM     Assistive Device (Sit-Stand Transfers)  walker, front-wheeled  -DM     Row Name 05/06/20 1650          Gait/Stairs Assessment/Training    Gait/Stairs Assessment/Training  gait/ambulation independence  -DM     [place] Level (Gait)  verbal cues;minimum assist (75% patient effort)  -DM     Assistive Device (Gait)  walker, front-wheeled  -DM     Distance in Feet (Gait)  32  -DM     Pattern (Gait)  step-through  -DM     Deviations/Abnormal Patterns (Gait)  base of support, narrow;donell decreased;stride length decreased Dec step length; init no LLE pain w/ mobil but min. onset w/Stand to sit in recl.  -DM     Bilateral Gait Deviations  forward flexed posture;heel strike decreased  -DM     Comment (Gait/Stairs)  cues to incr step length & ROSA, Ext trunk/focus ahead, PLB  -DM       User Key  (r) = Recorded By, (t) = Taken By, (c) = Cosigned By    Initials Name Provider Type    DM Luisa South, PT Physical Therapist        Obj/Interventions     Row Name 05/06/20 1650          Therapeutic Exercise    Lower Extremity (Therapeutic Exercise)  gluteal sets;hamstring sets, bilateral;heel slides, bilateral;LAQ (long arc quad), bilateral;quad sets, bilateral  -DM     Lower Extremity Range of Motion (Therapeutic Exercise)  hip abduction/adduction, bilateral;ankle dorsiflexion/plantar flexion, bilateral;hip flexion/extension, bilateral gentle hip F/E @EOB  -DM     Exercise Type (Therapeutic Exercise)  AROM (active range of motion);isometric contraction, static  -DM     Position  (Therapeutic Exercise)  seated  -DM     Sets/Reps (Therapeutic Exercise)  1/10 (1/2O AP,AC, LAQ0  -DM     Expected Outcome (Therapeutic Exercise)  improve functional stability;improve functional tolerance, single extremity activity  -DM     Row Name 05/06/20 1650          Static Sitting Balance    Level of [place] (Unsupported Sitting, Static Balance)  independent  -DM     Sitting Position (Unsupported Sitting, Static Balance)  sitting on edge of bed;sitting in chair  -DM     Time Able to Maintain Position (Unsupported Sitting, Static Balance)  3 to 4 minutes  -DM     Comment (Unsupported Sitting, Static Balance)  LE exer  -DM     Row Name 05/06/20 1650          Dynamic Sitting Balance    Level of [place], Reaches Outside Midline (Sitting, Dynamic Balance)  supervision  -DM     Sitting Position, Reaches Outside Midline (Sitting, Dynamic Balance)  sitting on edge of bed;sitting in chair  -DM     Comment, Reaches Outside Midline (Sitting, Dynamic Balance)  recip scooting  -DM     Row Name 05/06/20 1650          Static Standing Balance    Level of [place] (Supported Standing, Static Balance)  contact guard assist  -DM     Time Able to Maintain Position (Supported Standing, Static Balance)  30 to 45 seconds  -DM     Assistive Device Utilized (Supported Standing, Static Balance)  walker, rolling  -DM     Comment (Supported Standing, Static Balance)  TRUNK ext  -DM     Row Name 05/06/20 1650          Dynamic Standing Balance    Level of [place], Reaches Outside Midline (Standing, Dynamic Balance)  contact guard assist  -DM     Time Able to Maintain Position, Reaches Outside Midline (Standing, Dynamic Balance)  15 to 30 seconds  -DM     Assistive Device Utilized (Supported Standing, Dynamic Balance)  walker, rolling  -DM     Comment, Reaches Outside Midline (Standing, Dynamic Balance)  WS to init sidestepping/align w/ R wx  -DM       User Key  (r) = Recorded By, (t) = Taken By, (c) = Cosigned By     Initials Name Provider Type    Luisa Angel, PT Physical Therapist        Goals/Plan    No documentation.       Clinical Impression     Row Name 05/06/20 1650          Pain Assessment    Additional Documentation  Pain Scale: FACES Pre/Post-Treatment (Group)  -DM     Row Name 05/06/20 1650          Pain Scale: Numbers Pre/Post-Treatment    Pain Location  back & LLE  -DM     Pain Intervention(s)  Repositioned;Elevated  -DM     Row Name 05/06/20 1650          Pain Scale: FACES Pre/Post-Treatment    Pain: FACES Scale, Pretreatment  2-->hurts little bit  -DM     Pain: FACES Scale, Post-Treatment  4-->hurts little more  -DM     Row Name 05/06/20 1650          Vital Signs    Pre Systolic BP Rehab  146  -DM     Pre Treatment Diastolic BP  80  -DM     Post Systolic BP Rehab  164  -DM     Post Treatment Diastolic BP  89  -DM     Pretreatment Heart Rate (beats/min)  84  -DM     Intratreatment Heart Rate (beats/min)  102  -DM     Posttreatment Heart Rate (beats/min)  96  -DM     Pre SpO2 (%)  96  -DM     O2 Delivery Pre Treatment  room air  -DM     Intra SpO2 (%)  95  -DM     O2 Delivery Intra Treatment  room air  -DM     Post SpO2 (%)  96  -DM     O2 Delivery Post Treatment  room air  -DM     Pre Patient Position  Supine  -DM     Intra Patient Position  Standing  -DM     Post Patient Position  Sitting  -DM     Rest Breaks   1  -DM     Row Name 05/06/20 1650          Positioning and Restraints    Pre-Treatment Position  in bed  -DM     Post Treatment Position  chair  -DM     In Chair  notified nsg;reclined;call light within reach;encouraged to call for assist;exit alarm on;waffle cushion;legs elevated  -DM       User Key  (r) = Recorded By, (t) = Taken By, (c) = Cosigned By    Initials Name Provider Type    Luisa Angel, PT Physical Therapist        Outcome Measures     Row Name 05/06/20 1650          How much help from another person do you currently need...    Turning from your back to your side while in flat  bed without using bedrails?  4  -DM     Moving from lying on back to sitting on the side of a flat bed without bedrails?  3  -DM     Moving to and from a bed to a chair (including a wheelchair)?  3  -DM     Standing up from a chair using your arms (e.g., wheelchair, bedside chair)?  3  -DM     Climbing 3-5 steps with a railing?  3  -DM     To walk in hospital room?  3  -DM     AM-PAC 6 Clicks Score (PT)  19  -DM     Row Name 05/06/20 1650          Functional Assessment    Outcome Measure Options  AM-PAC 6 Clicks Basic Mobility (PT)  -DM       User Key  (r) = Recorded By, (t) = Taken By, (c) = Cosigned By    Initials Name Provider Type    Luisa Angel, PT Physical Therapist        Physical Therapy Education                 Title: PT OT SLP Therapies (In Progress)     Topic: Physical Therapy (In Progress)     Point: Mobility training (In Progress)     Description:   Instruct learner(s) on safety and technique for assisting patient out of bed, chair or wheelchair.  Instruct in the proper use of assistive devices, such as walker, crutches, cane or brace.              Patient Friendly Description:   It's important to get you on your feet again, but we need to do so in a way that is safe for you. Falling has serious consequences, and your personal safety is the most important thing of all.        When it's time to get out of bed, one of us or a family member will sit next to you on the bed to give you support.     If your doctor or nurse tells you to use a walker, crutches, a cane, or a brace, be sure you use it every time you get out of bed, even if you think you don't need it.    Learning Progress Summary           Patient Eager, E,D, NR by DM at 5/6/2020 1718    Acceptance, E, NR by DAYANARA at 5/5/2020 1430                   Point: Home exercise program (In Progress)     Description:   Instruct learner(s) on appropriate technique for monitoring, assisting and/or progressing patient with therapeutic exercises and  activities.              Learning Progress Summary           Patient Eager, E,D, NR by DM at 5/6/2020 1718    Acceptance, E, NR by DAYANARA at 5/5/2020 1430                   Point: Body mechanics (In Progress)     Description:   Instruct learner(s) on proper positioning and spine alignment for patient and/or caregiver during mobility tasks and/or exercises.              Learning Progress Summary           Patient Eager, E,D, NR by DM at 5/6/2020 1718    Acceptance, E, NR by DAYANARA at 5/5/2020 1430                   Point: Precautions (In Progress)     Description:   Instruct learner(s) on prescribed precautions during mobility and gait tasks              Learning Progress Summary           Patient Eager, E,D, NR by DM at 5/6/2020 1718    Acceptance, E, NR by DAYANARA at 5/5/2020 1430                               User Key     Initials Effective Dates Name Provider Type Discipline    DAYANARA 06/19/15 -  Juju Whyte, PT Physical Therapist PT    SOUMYA 06/19/15 -  Luisa South, PT Physical Therapist PT              PT Recommendation and Plan     Plan of Care Reviewed With: patient  Progress: improving  Outcome Summary: Performed STS x 2 trials from EOB as well as stand to sit in recliner w/ R wx & CGA, amb 32 ft w/ R wx & CGA, & performed LE ther exer & bal activ w/ mult rests d/t incr back pain & min. LLE discomfort; noted elev SBP, HR to 102, desat to 95% on RA, & fatigue     Time Calculation:   PT Charges     Row Name 05/06/20 1721             Time Calculation    Start Time  1650  -DM      PT Received On  05/06/20  -DM      PT Goal Re-Cert Due Date  05/15/20  -DM         Time Calculation- PT    Total Timed Code Minutes- PT  23 minute(s)  -DM         Timed Charges    62854 - PT Therapeutic Exercise Minutes  13  -DM      64276 - Gait Training Minutes   10  -DM        User Key  (r) = Recorded By, (t) = Taken By, (c) = Cosigned By    Initials Name Provider Type    DM Luisa South, PT Physical Therapist        Therapy Charges for  Today     Code Description Service Date Service Provider Modifiers Qty    94078377306 HC PT THER PROC EA 15 MIN 5/6/2020 Luisa South, PT GP 1    10828379279 HC GAIT TRAINING EA 15 MIN 5/6/2020 Luisa South, PT GP 1          PT G-Codes  Outcome Measure Options: AM-PAC 6 Clicks Basic Mobility (PT)  AM-PAC 6 Clicks Score (PT): 19  AM-PAC 6 Clicks Score (OT): 18    Luisa South, PT  5/6/2020

## 2020-05-06 NOTE — PLAN OF CARE
Problem: Patient Care Overview  Goal: Plan of Care Review  Flowsheets (Taken 5/6/2020 1000)  Outcome Summary: OT eval completed Pt presents with deficits in balance, strength, and activity tolerance for ADL completion, CGA STS and CGA steps chair to BSC then to bed no LOB throughout at [facility], CGA donning undergarment, SUP bed mob sitting to supine; recom IPOT d/c home with assist and HHOT

## 2020-05-06 NOTE — PROGRESS NOTES
Continued Stay Note  [facility]     Patient Name: Emily Cevallos  MRN: [id number]  Today's Date: 5/6/2020    Admit Date: 5/4/2020    Discharge Plan     Row Name 05/06/20 1522       Plan    Plan Comments  CM followed up on referrals pending at SNFs, left messages with liaisons. Pt's preference is [facility] as this is close to her home.  Pt currently has no bed offers for rehab, CM will await return calls or follow up again tomorrow.         Discharge Codes    No documentation.             Lara Shepherd RN

## 2020-05-06 NOTE — PLAN OF CARE
Patient's VSS, UOP adequate.  Alert o x4 throughout shift.  CIWA scale added.  SCD's in place.  Excederin given for headache.  BM's x 2 this shift.  Fall precautions in place.

## 2020-05-07 VITALS
OXYGEN SATURATION: 95 % | SYSTOLIC BLOOD PRESSURE: 153 MMHG | WEIGHT: 97 LBS | HEART RATE: 94 BPM | DIASTOLIC BLOOD PRESSURE: 75 MMHG | HEIGHT: 67 IN | TEMPERATURE: 97.6 F | RESPIRATION RATE: 16 BRPM | BODY MASS INDEX: 15.22 KG/M2

## 2020-05-07 LAB
ANION GAP SERPL CALCULATED.3IONS-SCNC: 11 MMOL/L (ref 5–15)
BUN BLD-MCNC: 26 MG/DL (ref 8–23)
BUN/CREAT SERPL: 24.1 (ref 7–25)
CALCIUM SPEC-SCNC: 9 MG/DL (ref 8.6–10.5)
CHLORIDE SERPL-SCNC: 101 MMOL/L (ref 98–107)
CO2 SERPL-SCNC: 22 MMOL/L (ref 22–29)
CREAT BLD-MCNC: 1.08 MG/DL (ref 0.57–1)
GFR SERPL CREATININE-BSD FRML MDRD: 50 ML/MIN/1.73
GLUCOSE BLD-MCNC: 91 MG/DL (ref 65–99)
POTASSIUM BLD-SCNC: 4.3 MMOL/L (ref 3.5–5.2)
SODIUM BLD-SCNC: 134 MMOL/L (ref 136–145)
VIT B1 BLD-SCNC: 278.6 NMOL/L (ref 66.5–200)

## 2020-05-07 PROCEDURE — 97116 GAIT TRAINING THERAPY: CPT

## 2020-05-07 PROCEDURE — G0378 HOSPITAL OBSERVATION PER HR: HCPCS

## 2020-05-07 PROCEDURE — 94799 UNLISTED PULMONARY SVC/PX: CPT

## 2020-05-07 PROCEDURE — 96367 TX/PROPH/DG ADDL SEQ IV INF: CPT

## 2020-05-07 PROCEDURE — 80048 BASIC METABOLIC PNL TOTAL CA: CPT | Performed by: INTERNAL MEDICINE

## 2020-05-07 PROCEDURE — 97110 THERAPEUTIC EXERCISES: CPT

## 2020-05-07 PROCEDURE — 25010000002 HEPARIN (PORCINE) PER 1000 UNITS: Performed by: INTERNAL MEDICINE

## 2020-05-07 PROCEDURE — 96372 THER/PROPH/DIAG INJ SC/IM: CPT

## 2020-05-07 PROCEDURE — 25010000002 THIAMINE PER 100 MG: Performed by: INTERNAL MEDICINE

## 2020-05-07 PROCEDURE — 99217 PR OBSERVATION CARE DISCHARGE MANAGEMENT: CPT | Performed by: INTERNAL MEDICINE

## 2020-05-07 RX ORDER — LIDOCAINE 50 MG/G
1 PATCH TOPICAL
Qty: 30 EACH | Refills: 2 | Status: SHIPPED | OUTPATIENT
Start: 2020-05-08 | End: 2020-05-07

## 2020-05-07 RX ORDER — LANOLIN ALCOHOL/MO/W.PET/CERES
100 CREAM (GRAM) TOPICAL DAILY
Qty: 30 TABLET | Refills: 1 | Status: SHIPPED | OUTPATIENT
Start: 2020-05-07

## 2020-05-07 RX ORDER — ACETAMINOPHEN 325 MG/1
650 TABLET ORAL EVERY 6 HOURS PRN
Start: 2020-05-07 | End: 2021-05-25 | Stop reason: HOSPADM

## 2020-05-07 RX ORDER — LIDOCAINE 50 MG/G
1 PATCH TOPICAL
Qty: 30 EACH | Refills: 2 | Status: SHIPPED | OUTPATIENT
Start: 2020-05-08 | End: 2021-05-25 | Stop reason: HOSPADM

## 2020-05-07 RX ORDER — CHOLECALCIFEROL (VITAMIN D3) 125 MCG
5 CAPSULE ORAL NIGHTLY PRN
Start: 2020-05-07

## 2020-05-07 RX ORDER — FLUTICASONE PROPIONATE 50 MCG
2 SPRAY, SUSPENSION (ML) NASAL DAILY
Start: 2020-05-08

## 2020-05-07 RX ORDER — LANOLIN ALCOHOL/MO/W.PET/CERES
100 CREAM (GRAM) TOPICAL DAILY
Qty: 30 TABLET | Refills: 1 | Status: SHIPPED | OUTPATIENT
Start: 2020-05-07 | End: 2020-05-07 | Stop reason: SDUPTHER

## 2020-05-07 RX ADMIN — CARVEDILOL 12.5 MG: 12.5 TABLET, FILM COATED ORAL at 09:07

## 2020-05-07 RX ADMIN — BUDESONIDE AND FORMOTEROL FUMARATE DIHYDRATE 2 PUFF: 160; 4.5 AEROSOL RESPIRATORY (INHALATION) at 09:41

## 2020-05-07 RX ADMIN — THIAMINE HYDROCHLORIDE 100 MG: 100 INJECTION, SOLUTION INTRAMUSCULAR; INTRAVENOUS at 09:08

## 2020-05-07 RX ADMIN — SODIUM CHLORIDE, PRESERVATIVE FREE 10 ML: 5 INJECTION INTRAVENOUS at 09:08

## 2020-05-07 RX ADMIN — ROSUVASTATIN CALCIUM 10 MG: 10 TABLET, COATED ORAL at 09:07

## 2020-05-07 RX ADMIN — ASPIRIN 81 MG 81 MG: 81 TABLET ORAL at 09:07

## 2020-05-07 RX ADMIN — HEPARIN SODIUM 5000 UNITS: 5000 INJECTION, SOLUTION INTRAVENOUS; SUBCUTANEOUS at 09:08

## 2020-05-07 RX ADMIN — CETIRIZINE HYDROCHLORIDE 5 MG: 10 TABLET, FILM COATED ORAL at 09:06

## 2020-05-07 RX ADMIN — FLUTICASONE PROPIONATE 2 SPRAY: 50 SPRAY, METERED NASAL at 09:10

## 2020-05-07 RX ADMIN — LIDOCAINE 1 PATCH: 50 PATCH CUTANEOUS at 09:08

## 2020-05-07 NOTE — DISCHARGE SUMMARY
[facility] Medicine Services  DISCHARGE SUMMARY    Patient Name: Emily Cevallos  : 1950  MRN: [id number]    Date of Admission: 2020  6:30 PM  Date of Discharge:  2020  Primary Care Physician: Cornelia Galvez PA    Consults     No orders found from 2020 to 2020.          Hospital Course     Presenting Problem:   AMS (altered mental status) [R41.82]    Active Hospital Problems    Diagnosis  POA   • **AMS (altered mental status) [R41.82]  Yes   • Essential hypertension [I10]  Yes   • Alcoholism (CMS/Formerly [facility]) [F10.20]  Yes   • LIDA (acute kidney injury) (CMS/Formerly [facility]) [N17.9]  Yes   • Anemia [D64.9]  Yes   • Leukocytosis [D72.829]  Yes   • COPD (chronic obstructive pulmonary disease) (CMS/Formerly [facility]) [J44.9]  Yes   • Intractable nausea and vomiting [R11.2]  Yes   • Abdominal pain [R10.9]  Yes      Resolved Hospital Problems   No resolved problems to display.          Hospital Course:  Emily Cevallos is a 70 y.o. female with history of alcohol abuse recent admission to [place] after a fall at the beginning of April with left hip fracture now s/p repair.  Did well at [facility] and discharged home but subsequently had confusion with readmission to [facility] and negative workup (other than UDS positive for cocaine and THC which patient denied), improvement in mental status,.  Became more confused after returning home and developed vomiting and presented here.  Workup here overall unremarkable but mental status improved with holding narcotic medication and baclofen as suspect these may be contributing factor.     Encephalopathy  -History of alcohol abuse but roommate reports no drink since admission for fall/hip fracture >1 month ago  -Suspect there may be some contribution of opiates and her chronic baclofen.  I recommended to the patient and her roommate that these be avoided in the future.  Continue lidoderm patch to low back (prior authorization pending at discharge)  -CT head  negative for acute change  -CT abdomen/pelvis negative for acute change.  Left renal lesion is simple cyst on renal ultrasound.  -B12 WNL, ammonia WNL, thiamine (pending).  She was given IV thiamine supplement empirically given history of EtOH use and low body weight and PO supplement will be continued at discharge     LIDA vs. CKD  -Creatine at presumed baseline, she was given IV fluids on admission     Daily Care Communication  Due to current limited visitation policies, an attempt will be made daily to update patient's identified best point-of-contact(s)   Contact: Tara Bear   Relation: Roommate/POA   Type of communication (phone or televideo): Telephone   Time of communication: 2:00 pm   Notes (if applicable): Updated on patient status, dispo plans in progress        Discharge Follow Up Recommendations for outpatient labs/diagnostics:  Follow up with PCP within 1 week    Day of Discharge     HPI:   Feels well today.  Walked well with physical therapy.  She is ready to go home.    Review of Systems  Gen- No fevers, chills  CV- No chest pain, palpitations  Resp- No cough, dyspnea  GI- No N/V/D, abd pain    Vital Signs:   Temp:  [97.6 °F (36.4 °C)-98.3 °F (36.8 °C)] 97.6 °F (36.4 °C)  Heart Rate:  [] 86  Resp:  [16-18] 16  BP: (133-164)/(75-90) 153/75     Physical Exam:  Constitutional: No acute distress, awake, alert, sitting up in chair  HENT: NCAT, mucous membranes moist  Respiratory: Clear to auscultation bilaterally, respiratory effort normal   Cardiovascular: RRR, no murmurs, rubs, or gallops  Gastrointestinal: Positive bowel sounds, soft, nontender, nondistended  Musculoskeletal: No bilateral ankle edema  Psychiatric: Appropriate affect, cooperative  Neurologic: Oriented x 3, Cranial Nerves grossly intact to confrontation, speech clear  Skin: Scabbed abrasions on left cheek and nose-improved from yesterday    Pertinent  and/or Most Recent Results     Results from last 7 days   Lab Units  05/07/20  0654 05/06/20  0556 05/05/20  0752 05/04/20  1829   WBC 10*3/mm3  --  9.32 9.34 11.50*   HEMOGLOBIN g/dL  --  10.1* 10.1* 10.6*   HEMATOCRIT %  --  31.8* 30.8* 33.3*   PLATELETS 10*3/mm3  --  437 407 464*   SODIUM mmol/L 134* 135* 136 142   POTASSIUM mmol/L 4.3 4.2 4.2 4.4   CHLORIDE mmol/L 101 99 99 102   CO2 mmol/L 22.0 24.0 25.0 26.0   BUN mg/dL 26* 26* 20 32*   CREATININE mg/dL 1.08* 1.21* 1.08* 1.32*   GLUCOSE mg/dL 91 83 89 131*   CALCIUM mg/dL 9.0 9.2 9.4 9.8     Results from last 7 days   Lab Units 05/05/20  0752 05/04/20  1829   BILIRUBIN mg/dL 0.3 0.2   ALK PHOS U/L 77 84   ALT (SGPT) U/L 20 23   AST (SGOT) U/L 19 20           Invalid input(s): TG, LDLCALC, LDLREALC  Results from last 7 days   Lab Units 05/04/20 2223 05/04/20  1829   PROBNP pg/mL  --  2,622.0*   TROPONIN T ng/mL  --  <0.010   PROCALCITONIN ng/mL 0.08*  --    LACTATE mmol/L 1.0  --        Brief Urine Lab Results  (Last result in the past 365 days)      Color   Clarity   Blood   Leuk Est   Nitrite   Protein   CREAT   Urine HCG        05/04/20 1953 Yellow Clear Negative Negative Negative 30 mg/dL (1+)               Microbiology Results Abnormal     Procedure Component Value - Date/Time    Blood Culture - Blood, Arm, Right [[id number]] Collected:  05/04/20 2223    Lab Status:  Preliminary result Specimen:  Blood from Arm, Right Updated:  05/06/20 2245     Blood Culture No growth at 2 days    Blood Culture - Blood, Arm, Left [[id number]] Collected:  05/04/20 1829    Lab Status:  Preliminary result Specimen:  Blood from Arm, Left Updated:  05/06/20 2245     Blood Culture No growth at 2 days          Imaging Results (All)     Procedure Component Value Units Date/Time    US Renal Bilateral [[id number]] Collected:  05/05/20 1016     Updated:  05/05/20 1541    Narrative:       EXAMINATION: US RENAL BILATERAL- 05/05/2020     INDICATION: R41.82-Altered mental status, unspecified; N28.9-Disorder of  kidney and ureter, unspecified;  Z86.39-Personal history of other  endocrine, nutritional and metabolic disease; Z86.79-Personal history of  other diseases of the circulatory system; Z87.81-Personal history of  (healed) traumatic fracture      TECHNIQUE: Ultrasound kidneys and urinary bladder     COMPARISON: NONE     FINDINGS:      Right kidney measures 9.1 cm in length without evidence of  hydronephrosis, contour deforming mass or obvious calculi.  Left kidney measures 7.9 cm in length without hydronephrosis or obvious  calculi containing a subtle hypoechoic area measuring 1 cm of likely  simple renal cortical cysts without complex features or internal  architecture/flow.  Urinary bladder is mild to moderately distended and grossly  unremarkable.       Impression:       No acute sonographic findings within the visualized kidneys  specifically no hydronephrosis. Area of hypoechogenicity seen within the  left kidney measuring 1 cm consistent of simple renal cortical cyst  corresponding to area seen on recent CT. No required follow-up however  ultrasound may be considered for follow-up within 6-12 months to  evaluate for stability if clinically warranted.      D:  05/05/2020  E:  05/05/2020     This report was finalized on 5/5/2020 3:38 PM by Dr. Niles Langston.       CT Abdomen Pelvis With Contrast [[id number]] Collected:  05/05/20 0158     Updated:  05/05/20 0200    Narrative:       CT Abdomen Pelvis W    INDICATION:   Abdominal pain today.    TECHNIQUE:   CT of the abdomen and pelvis with IV contrast. Coronal and sagittal reconstructions were obtained.  Radiation dose reduction techniques included automated exposure control or exposure modulation based on body size. Count of known CT and cardiac nuc med  studies performed in previous 12 months: 2.     COMPARISON:   None available.    FINDINGS:  For description of findings in the lung bases, see the chest CT report dictated separately. There is severe dextroscoliosis centered at T12-L1. There is  atherosclerotic disease with no aortic aneurysm. This exam is somewhat degraded by the lack of oral  contrast and intra-abdominal fat has a natural contrast medium. Gallstones are present within the gallbladder. No biliary obstruction is seen. There is a 1.5 cm hyperdense lesion in the left renal cortex. This may be a hyperdense cyst, but nonemergent  renal ultrasound is recommended to exclude a mass. Solid abdominal organs are otherwise within normal limits. Urinary bladder is mildly distended but otherwise normal. Uterus is atrophied or surgically absent. There is no bowel obstruction. No convincing  evidence of colitis or enteritis. The appendix is not clearly identified. Postoperative changes are noted in the left proximal femur.      Impression:         1. Limited exam of the GI tract due to the lack of oral contrast and the lack of intra-abdominal fat is a natural contrast medium.  2. No bowel obstruction is seen, and there is no definite acute GI tract inflammation. The appendix is not clearly visible.  3. Mildly distended urinary bladder.  4. Cholelithiasis.  5. Hyperdense left renal lesion, possibly a hyperdense cyst or mass. Nonemergent renal ultrasound is recommended for follow-up.          Signer Name: Colt Pemberton MD   Signed: 5/5/2020 1:58 AM   Workstation Name: [email or web address]    [facility]    CT Head Without Contrast [[id number]] Collected:  05/05/20 0006     Updated:  05/05/20 0009    Narrative:       CT Head WO    HISTORY:   Confusion and headache today.    TECHNIQUE:   Axial unenhanced head CT with coronal reformats. Radiation dose reduction techniques included automated exposure control or exposure modulation based on body size. Count of known CT and cardiac nuc med studies performed in previous 12 months: 0.     COMPARISON:   None.    FINDINGS:   There is generalized atrophy. Ventricular size and configuration are within normal limits. Chronic small vessel ischemic changes  are present in the white matter. Atherosclerotic calcifications are noted in the vertebral arteries and carotid siphons. No  acute infarct or hemorrhage is seen. There are no masses. There is no skull fracture.  Visualized paranasal sinuses and mastoid air cells are clear.      Impression:       Senescent changes without acute abnormality.    Signer Name: Colt Pemberton MD   Signed: 5/5/2020 12:06 AM   Workstation Name: [facility]    CT Chest Without Contrast [[id number]] Collected:  05/05/20 0001     Updated:  05/05/20 0003    Narrative:       CT Chest WO    INDICATION:   Confusion and headache. Possible pneumonia.    TECHNIQUE:   CT of the thorax without IV contrast. Coronal and sagittal reconstructions were obtained.  Radiation dose reduction techniques included automated exposure control or exposure modulation based on body size. Count of known CT and cardiac nuc med studies  performed in previous 12 months: 0.     COMPARISON:   None available.    FINDINGS:  Dextroscoliosis noted at the thoracolumbar junction. There is atherosclerotic disease and coronary artery disease. No pleural or pericardial effusion is seen. There is no adenopathy. There is essentially complete left lower lobe atelectasis which could  be a chronic long-standing finding. Comparison with any outside prior to be useful. There is some mild scarring or atelectasis in the lingula. There is also some right upper lobe scarring. No convincing evidence of acute pneumonia at this time. Upper  abdomen is remarkable for atherosclerotic disease.      Impression:         1. Essentially complete left lower lobe atelectasis, possibly a chronic finding.  2. No CT findings present to indicate pneumonia. Note: CT may be negative in the earliest stages of COVID-19.    Signer Name: Colt Pemberton MD   Signed: 5/5/2020 12:01 AM   Workstation Name: [facility]                          Plan for Follow-up of Pending Labs/Results: Patient already receiving thiamine supplement empirically.     Order Current Status    Vitamin B1, Whole Blood In process    Blood Culture - Blood, Arm, Left Preliminary result    Blood Culture - Blood, Arm, Right Preliminary result        Discharge Details        Discharge Medications      New Medications      Instructions Start Date   acetaminophen 325 MG tablet  Commonly known as:  TYLENOL   650 mg, Oral, Every 6 Hours PRN      fluticasone 50 MCG/ACT nasal spray  Commonly known as:  FLONASE   2 sprays, Each Nare, Daily   Start Date:  May 8, 2020     lidocaine 5 %  Commonly known as:  LIDODERM   1 patch, Transdermal, Every 24 Hours Scheduled, Remove & Discard patch within 12 hours or as directed by MD.  Place on low back.   Start Date:  May 8, 2020     melatonin 5 MG tablet tablet   5 mg, Oral, Nightly PRN      thiamine 100 MG tablet  Commonly known as:  VITAMIN B1   100 mg, Oral, Daily         Continue These Medications      Instructions Start Date   aspirin 81 MG chewable tablet   81 mg, Oral, Daily      Breo Ellipta 100-25 MCG/INH inhaler  Generic drug:  Fluticasone Furoate-Vilanterol   1 puff, Inhalation, Daily - RT      carvedilol 12.5 MG tablet  Commonly known as:  COREG   12.5 mg, Oral, 2 Times Daily With Meals      cetirizine 10 MG tablet  Commonly known as:  zyrTEC   10 mg, Oral, Daily      docusate sodium 100 MG capsule  Commonly known as:  COLACE   100 mg, Oral, 2 Times Daily      ipratropium-albuterol  MCG/ACT inhaler  Commonly known as:  COMBIVENT RESPIMAT   1 puff, Inhalation      lisinopril 5 MG tablet  Commonly known as:  PRINIVIL,ZESTRIL   5 mg, Oral, Daily      montelukast 10 MG tablet  Commonly known as:  SINGULAIR   10 mg, Oral, Nightly      rosuvastatin 10 MG tablet  Commonly known as:  CRESTOR   10 mg, Oral, Daily         Stop These Medications    baclofen 20 MG tablet  Commonly known as:  LIORESAL            Allergies   Allergen Reactions    • Fosamax [Alendronate Sodium] Rash         Discharge Disposition:  Home-Health Care [facility]    Diet:  Hospital:  Diet Order   Procedures   • Diet Soft Texture; Ground          CODE STATUS:    Code Status and Medical Interventions:   Ordered at: 05/05/20 0120     Level Of Support Discussed With:    Patient     Code Status:    CPR     Medical Interventions (Level of Support Prior to Arrest):    Full       No future appointments.    Additional Instructions for the Follow-ups that You Need to Schedule     Ambulatory Referral to Home Health   As directed      Face to Face Visit Date:  5/7/2020    Follow-up provider for Plan of Care?:  I treated the patient in an acute care facility and will not continue treatment after discharge.    Follow-up provider:  CORNELIA GALVEZ [[id number]]    Reason/Clinical Findings:  hypertension, LIDA, COPD, s/p hip fracture on 04/04/2020, impaired mobility and adls    Describe mobility limitations that make leaving home difficult:  hypertension, LIDA, COPD, s/p hip fracture on 04/04/2020, impaired mobility and adls    Nursing/Therapeutic Services Requested:  Physical Therapy (aide) Occupational Therapy Other    PT orders:  Strengthening Home safety assessment    Occupational orders:  Activities of daily living Strengthening Home safety assessment    Frequency:  Other         Discharge Follow-up with PCP   As directed       Currently Documented PCP:    Cornelia Galvez PA    PCP Phone Number:    [phone]     Follow Up Details:  Within 1 week               Time Spent on Discharge:  39 minutes    Electronically signed by Jessica Dai MD, 05/07/20, 2:17 PM.

## 2020-05-07 NOTE — THERAPY TREATMENT NOTE
Acute Care - Physical Therapy Treatment Note  [facility]     Patient Name: Emily Cevallos  : 1950  MRN: [id number]  Today's Date: 2020             Admit Date: 2020    Visit Dx:    ICD-10-CM ICD-9-CM   1. Altered mental status, unspecified altered mental status type R41.82 780.97   2. Renal insufficiency N28.9 593.9   3. History of hyperlipidemia Z86.39 V12.29   4. History of hypertension Z86.79 V12.59   5. Status post closed fracture of left hip Z87.81 V15.51   6. Impaired mobility and ADLs Z74.09 799.89     Patient Active Problem List   Diagnosis   • Essential hypertension   • Alcoholism (CMS/HCC)   • AMS (altered mental status)   • LIDA (acute kidney injury) (CMS/[facility])   • Anemia   • Leukocytosis   • COPD (chronic obstructive pulmonary disease) (CMS/[facility])   • Intractable nausea and vomiting   • Abdominal pain       Therapy Treatment    Rehabilitation Treatment Summary     Row Name 20             Treatment Time/Intention    Discipline  physical therapist  -      Document Type  therapy note (daily note)  -      Subjective Information  complains of;weakness  -      Mode of Treatment  physical therapy  -      Patient Effort  excellent  -      Existing Precautions/Restrictions  fall  -      Recorded by [JM] Lara Dee, PT 2034      Row Name 20             Vital Signs    Pre Systolic BP Rehab  160  -JM      Pre Treatment Diastolic BP  85  -JM      Post Systolic BP Rehab  144  -JM      Post Treatment Diastolic BP  86  -JM      Pretreatment Heart Rate (beats/min)  92  -JM      Posttreatment Heart Rate (beats/min)  94  -JM      Pre SpO2 (%)  92  -JM      O2 Delivery Pre Treatment  room air  -      Post SpO2 (%)  92  -JM      O2 Delivery Post Treatment  room air  -      Pre Patient Position  Supine  -JM      Post Patient Position  Sitting  -      Recorded by [JM] Lara Dee, PT 20 0934      Row Name 20             Cognitive  Assessment/Intervention- PT/OT    Affect/Mental Status (Cognitive)  WNL  -JM      Orientation Status (Cognition)  oriented x 4  -JM      Follows Commands (Cognition)  WFL  -JM      Recorded by [JM] Lara Dee, PT 05/07/20 0934      Row Name 05/07/20 0900             Bed Mobility Assessment/Treatment    Supine-Sit [place] (Bed Mobility)  supervision  -      Assistive Device (Bed Mobility)  bed rails;head of bed elevated  -JM      Recorded by [JM] Lara Dee, PT 05/07/20 0934      Row Name 05/07/20 0900             Sit-Stand Transfer    Sit-Stand [place] (Transfers)  verbal cues;contact guard  -      Assistive Device (Sit-Stand Transfers)  walker, front-wheeled  -JM      Recorded by [JM] Lara Dee, PT 05/07/20 0934      Row Name 05/07/20 0900             Stand-Sit Transfer    Stand-Sit [place] (Transfers)  verbal cues;contact guard  -      Assistive Device (Stand-Sit Transfers)  walker, front-wheeled  -JM      Recorded by [JM] Lara Dee, PT 05/07/20 0934      Row Name 05/07/20 0900             Gait/Stairs Assessment/Training    36040 - Gait Training Minutes   15  -JM      [place] Level (Gait)  verbal cues;contact guard  -      Assistive Device (Gait)  walker, front-wheeled  -JM      Distance in Feet (Gait)  120  -JM      Pattern (Gait)  step-through  -      Bilateral Gait Deviations  forward flexed posture;heel strike decreased  -JM      Recorded by [JM] Lara Dee, PT 05/07/20 0934      Row Name 05/07/20 0900             Therapeutic Exercise    23572 - PT Therapeutic Exercise Minutes  8  -JM      Recorded by [JM] Lara Dee, PT 05/07/20 0934      Row Name 05/07/20 0900             Therapeutic Exercise    Lower Extremity (Therapeutic Exercise)  LAQ (long arc quad), bilateral  -JM      Lower Extremity Range of Motion (Therapeutic Exercise)  hip flexion/extension, bilateral;knee flexion/extension, bilateral;ankle dorsiflexion/plantar  flexion, bilateral  -      Exercise Type (Therapeutic Exercise)  AROM (active range of motion)  -      Position (Therapeutic Exercise)  seated  -      Sets/Reps (Therapeutic Exercise)  1/20  -      Recorded by [JM] Lara Dee, PT 05/07/20 0934      Row Name 05/07/20 0900             Positioning and Restraints    Pre-Treatment Position  in bed  -      Post Treatment Position  chair  -JM      In Chair  reclined;call light within reach;encouraged to call for assist;with nsg;legs elevated  -      Recorded by [JM] Lara Dee, PT 05/07/20 0934      Row Name 05/07/20 0900             Pain Scale: Numbers Pre/Post-Treatment    Pain Scale: Numbers, Pretreatment  2/10  -      Pain Scale: Numbers, Post-Treatment  0/10 - no pain  -      Pain Location  back  -      Pain Intervention(s)  Repositioned  -      Recorded by [JM] Lara Dee, PT 05/07/20 0934      Row Name 05/07/20 0900             Plan of Care Review    Plan of Care Reviewed With  patient  -      Progress  improving  -      Outcome Summary  Improving activity tolerance and strength, SBA for bed mobility, CGA for sit<>stand with VC for safe hand placement, amb 120' with RW and CGA, good balance, VC for upright posture and RW use.  -      Recorded by [JM] Lara Dee, PT 05/07/20 0936        User Key  (r) = Recorded By, (t) = Taken By, (c) = Cosigned By    Initials Name Effective Dates Discipline     Lara Dee, PT 06/19/15 -  PT                   Physical Therapy Education                 Title: PT OT SLP Therapies (In Progress)     Topic: Physical Therapy (In Progress)     Point: Mobility training (In Progress)     Description:   Instruct learner(s) on safety and technique for assisting patient out of bed, chair or wheelchair.  Instruct in the proper use of assistive devices, such as walker, crutches, cane or brace.              Patient Friendly Description:   It's important to get you on your feet  again, but we need to do so in a way that is safe for you. Falling has serious consequences, and your personal safety is the most important thing of all.        When it's time to get out of bed, one of us or a family member will sit next to you on the bed to give you support.     If your doctor or nurse tells you to use a walker, crutches, a cane, or a brace, be sure you use it every time you get out of bed, even if you think you don't need it.    Learning Progress Summary           Patient Acceptance, E, NR by JACK at 5/7/2020 0934    Eager, E,D, NR by SOUMYA at 5/6/2020 1718    Acceptance, E, NR by DAYANARA at 5/5/2020 1430                   Point: Home exercise program (In Progress)     Description:   Instruct learner(s) on appropriate technique for monitoring, assisting and/or progressing patient with therapeutic exercises and activities.              Learning Progress Summary           Patient Acceptance, E, NR by JACK at 5/7/2020 0934    Zhang, VIOLA,D, NR by SOUMYA at 5/6/2020 1718    Acceptance, E, NR by DAYANARA at 5/5/2020 1430                   Point: Body mechanics (In Progress)     Description:   Instruct learner(s) on proper positioning and spine alignment for patient and/or caregiver during mobility tasks and/or exercises.              Learning Progress Summary           Patient Acceptance, E, NR by JACK at 5/7/2020 0934    Eager, E,D, NR by SOUMYA at 5/6/2020 1718    Acceptance, E, NR by DAYANARA at 5/5/2020 1430                   Point: Precautions (In Progress)     Description:   Instruct learner(s) on prescribed precautions during mobility and gait tasks              Learning Progress Summary           Patient Acceptance, E, NR by JACK at 5/7/2020 0934    Michelleer, VIOLA,D, NR by SOUMYA at 5/6/2020 1718    Acceptance, E, NR by DAYANARA at 5/5/2020 1430                               User Key     Initials Effective Dates Name Provider Type Discipline    DAYANARA 06/19/15 -  Juju Whyte, PT Physical Therapist PT    SOUMYA 06/19/15 -  Luisa South, PT  Physical Therapist PT    JACK 06/19/15 -  Lara Dee, PT Physical Therapist PT                PT Recommendation and Plan     Plan of Care Reviewed With: patient  Progress: improving  Outcome Summary: Improving activity tolerance and strength, SBA for bed mobility, CGA for sit<>stand with VC for safe hand placement, amb 120' with RW and CGA, good balance, VC for upright posture and RW use.  Outcome Measures     Row Name 05/07/20 0900 05/06/20 1000          How much help from another person do you currently need...    Turning from your back to your side while in flat bed without using bedrails?  4  -JM  --     Moving from lying on back to sitting on the side of a flat bed without bedrails?  3  -JM  --     Moving to and from a bed to a chair (including a wheelchair)?  3  -JM  --     Standing up from a chair using your arms (e.g., wheelchair, bedside chair)?  3  -JM  --     Climbing 3-5 steps with a railing?  3  -JM  --     To walk in hospital room?  3  -  --     AM-PAC 6 Clicks Score (PT)  19  -JM  --        How much help from another is currently needed...    Putting on and taking off regular lower body clothing?  --  3  -KF     Bathing (including washing, rinsing, and drying)  --  2  -KF     Toileting (which includes using toilet bed pan or urinal)  --  3  -KF     Putting on and taking off regular upper body clothing  --  3  -KF     Taking care of personal grooming (such as brushing teeth)  --  3  -KF     Eating meals  --  4  -KF     AM-PAC 6 Clicks Score (OT)  --  18  -KF        Functional Assessment    Outcome Measure Options  AM-PAC 6 Clicks Basic Mobility (PT)  -  AM-PAC 6 Clicks Daily Activity (OT)  -KF       User Key  (r) = Recorded By, (t) = Taken By, (c) = Cosigned By    Initials Name Provider Type    Lara Willis, PT Physical Therapist    KF Joan Zhang, OT Occupational Therapist         Time Calculation:   PT Charges     Row Name 05/07/20 0900             Time Calculation    Start  Time  0900  -      PT Received On  05/07/20  -JACK      PT Goal Re-Cert Due Date  05/15/20  -JACK         Timed Charges    24342 - PT Therapeutic Exercise Minutes  8  -      44032 - Gait Training Minutes   15  -JM        User Key  (r) = Recorded By, (t) = Taken By, (c) = Cosigned By    Initials Name Provider Type    Lara Willis, PT Physical Therapist        Therapy Charges for Today     Code Description Service Date Service Provider Modifiers Qty    07851001923  PT THER PROC EA 15 MIN 5/7/2020 Lara Dee, PT GP 1    41766329217 HC GAIT TRAINING EA 15 MIN 5/7/2020 Lara Dee, PT GP 1          PT G-Codes  Outcome Measure Options: AM-PAC 6 Clicks Basic Mobility (PT)  AM-PAC 6 Clicks Score (PT): 19  AM-PAC 6 Clicks Score (OT): 18    Lara Dee, PT  5/7/2020

## 2020-05-07 NOTE — PROGRESS NOTES
[facility] Medicine Services  PROGRESS NOTE    Patient Name: Emily Cevallos  : 1950  MRN: [id number]    Date of Admission: 2020  Primary Care Physician: Cornelia Galvez PA    Subjective   Subjective     CC: F/U altered mental status    HPI: No complaints today.  She is feeling OK.  Awaiting update about rehab.    Review of Systems  Gen- No fevers, chills  CV- No chest pain, palpitations  Resp- No cough, dyspnea  GI- No N/V/D, abd pain    Objective   Objective     Vital Signs:   Temp:  [97.9 °F (36.6 °C)-98.3 °F (36.8 °C)] 98.1 °F (36.7 °C)  Heart Rate:  [] 103  Resp:  [16-18] 18  BP: (133-180)/(77-98) 133/77        Physical Exam:  Constitutional: No acute distress, awake, alert, sitting up in chair talking on telephone  HENT: NCAT, mucous membranes moist  Respiratory: Clear to auscultation bilaterally, respiratory effort normal   Cardiovascular: RRR, no murmurs, rubs, or gallops  Gastrointestinal: Positive bowel sounds, soft, nontender, nondistended  Musculoskeletal: No bilateral ankle edema  Psychiatric: Appropriate affect, cooperative  Neurologic: Oriented x 3, Cranial Nerves grossly intact to confrontation, speech clear  Skin: Scabbed abrasions on left cheek and nose    Results Reviewed:  Results from last 7 days   Lab Units 2056 20  075203 20  1829   WBC 10*3/mm3 9.32 9.34  --  11.50*   HEMOGLOBIN g/dL 10.1* 10.1*  --  10.6*   HEMATOCRIT % 31.8* 30.8*  --  33.3*   PLATELETS 10*3/mm3 437 407  --  464*   PROCALCITONIN ng/mL  --   --  0.08*  --      Results from last 7 days   Lab Units 20  0556 20  07520  1829   SODIUM mmol/L 135* 136 142   POTASSIUM mmol/L 4.2 4.2 4.4   CHLORIDE mmol/L 99 99 102   CO2 mmol/L 24.0 25.0 26.0   BUN mg/dL 26* 20 32*   CREATININE mg/dL 1.21* 1.08* 1.32*   GLUCOSE mg/dL 83 89 131*   CALCIUM mg/dL 9.2 9.4 9.8   ALT (SGPT) U/L  --  20 23   AST (SGOT) U/L  --  19 20   TROPONIN T ng/mL  --    --  <0.010   PROBNP pg/mL  --   --  2,622.0*     Estimated Creatinine Clearance: 29.2 mL/min (A) (by C-G formula based on SCr of 1.21 mg/dL (H)).    Microbiology Results Abnormal     Procedure Component Value - Date/Time    Blood Culture - Blood, Arm, Left [[id number]] Collected:  05/04/20 1829    Lab Status:  Preliminary result Specimen:  Blood from Arm, Left Updated:  05/05/20 2246     Blood Culture No growth at 24 hours    Blood Culture - Blood, Arm, Right [[id number]] Collected:  05/04/20 2223    Lab Status:  Preliminary result Specimen:  Blood from Arm, Right Updated:  05/05/20 2231     Blood Culture No growth at 24 hours          Imaging Results (Last 24 Hours)     ** No results found for the last 24 hours. **               I have reviewed the medications:  Scheduled Meds:  aspirin 81 mg Oral Daily   budesonide-formoterol 2 puff Inhalation BID - RT   carvedilol 12.5 mg Oral BID With Meals   cetirizine 5 mg Oral Daily   [START ON 5/7/2020] fluticasone 2 spray Each Nare Daily   heparin (porcine) 5,000 Units Subcutaneous Q12H   lidocaine 1 patch Transdermal Q24H   montelukast 10 mg Oral Nightly   rosuvastatin 10 mg Oral Daily   sodium chloride 10 mL Intravenous Q12H   thiamine (VITAMIN B1) IVPB 100 mg Intravenous Daily     Continuous Infusions:  sodium chloride 75 mL/hr Last Rate: Stopped (05/05/20 2121)     PRN Meds:.•  acetaminophen  •  docusate sodium  •  ipratropium-albuterol  •  melatonin  •  ondansetron  •  sodium chloride  •  sodium chloride    Assessment/Plan   Assessment & Plan     Active Hospital Problems    Diagnosis  POA   • **AMS (altered mental status) [R41.82]  Yes   • Essential hypertension [I10]  Yes   • Alcoholism (CMS/HCC) [F10.20]  Yes   • LIDA (acute kidney injury) (CMS/[facility]) [N17.9]  Yes   • Anemia [D64.9]  Yes   • Leukocytosis [D72.829]  Yes   • COPD (chronic obstructive pulmonary disease) (CMS/[facility]) [J44.9]  Yes   • Intractable nausea and vomiting [R11.2]  Yes   • Abdominal pain [R10.9]  Yes       Resolved Hospital Problems   No resolved problems to display.        Brief Hospital Course to date:  Emily Cevallos is a 70 y.o. female with history of alcohol abuse recent admission to [place] after a fall at the beginning of April with left hip fracture now s/p repair.  Did well at [facility] and discharged home but subsequently had confusion with readmission to [place] and negative workup (other than UDS positive for cocaine and THC which patient denied), improvement in mental status,.  Became more confused after returning home and developed vomiting and presented here.  Workup here overall unremarkable but mental status improved.  Holding narcotic medication and baclofen as suspect these may be contributing factor.    Encephalopathy  -Has been worse at night per roommate  -History of alcohol abuse but roommate reports no drink since admission for fall/hip fracture >1 month ago  -Suspect there may be some contribution of opiates and her chronic baclofen.  Continue lidoderm patch to low back  -CT head negative for acute change  -CT abdomen/pelvis negative for acute change.  Left renal lesion is simple cyst on renal ultrasound.  -B12 WNL, ammonia WNL, checingk thiamine (pending) and started IV thiamine supplement empirically given history of EtOH use and low body weight     LIDA vs. CKD  -Conginue IV fluids another day  -Avoid nephrotoxins     COPD  -Scheduled duonebs     Hypertension  -Continue carvedilol  -Lisinopril on hold due to possible LIDA     Recent L hip fracture  -PT/OT    DVT Prophylaxis:  SQ heparin    Daily Care Communication  Due to current limited visitation policies, an attempt will be made daily to update patient's identified best point-of-contact(s)   Contact: Tara Bear   Relation: Roommate/POA   Type of communication (phone or televideo): Telephone   Time of communication: 5:20pm   Notes (if applicable): Updated on patient status, dispo plans in progress     Disposition: I expect the  patient to be discharged pending evaluation for rehabilitation.      CODE STATUS:   Code Status and Medical Interventions:   Ordered at: 05/05/20 0120     Level Of Support Discussed With:    Patient     Code Status:    CPR     Medical Interventions (Level of Support Prior to Arrest):    Full         Electronically signed by Jessica Dai MD, 05/06/20, 20:39.

## 2020-05-07 NOTE — PROGRESS NOTES
Continued Stay Note  [facility]     Patient Name: Emily Cevallos  MRN: [id number]  Today's Date: 5/7/2020    Admit Date: 5/4/2020    Discharge Plan     Row Name 05/07/20 1509       Plan    Plan Comments  Pt will require prior auth to be completed and CM will fax to [facility]. CM discussed with Dr. Dai and she is aware. CM also spoke with Tara again and updated medication will need a prior auth, she verbalized understanding.     Row Name 05/07/20 1969       Plan    Plan Comments  CM followed up on referrals and pt is in her copay days per Daily with Nicole. Pt would have an approx cost of $165 per day while at SNF. CM discussed with pt and she is unable to afford the copayment required. CM discussed options of returning home with home health services and she is agreeable. Pt requested CM speak with Tara in regards to plans and Home Health provider. CM spoke with Tara and updated on discharge plans and she is agreeable for pt to return home with home health services. CM reviewed home health providers with Tara and would like to use [facility]. CM spoke with Marcy at [facility] and referral given and they can accept pt for pt/ot and aide services. Pt to be discharged home today with private transportation. Pt denies needs for additioanal DME at home.     Final Discharge Disposition Code  06 - home with home health care        Discharge Codes    No documentation.       Expected Discharge Date and Time     Expected Discharge Date Expected Discharge Time    May 7, 2020             Lara Shepherd RN

## 2020-05-07 NOTE — PLAN OF CARE
Problem: Fall Risk (Adult)  Goal: Identify Related Risk Factors and Signs and Symptoms  Outcome: Ongoing (interventions implemented as appropriate)  Note:   VSS on room air; pt worked with PT this morning and sat up in chair for 2 hours; no c/o pain at this time, lidocaine patch applied to lower back; no complaints at this time, will continue to monitor  Goal: Absence of Fall  Outcome: Ongoing (interventions implemented as appropriate)     Problem: Patient Care Overview  Goal: Plan of Care Review  Outcome: Ongoing (interventions implemented as appropriate)  Goal: Individualization and Mutuality  Outcome: Ongoing (interventions implemented as appropriate)  Goal: Discharge Needs Assessment  Outcome: Ongoing (interventions implemented as appropriate)  Goal: Interprofessional Rounds/Family Conf  Outcome: Ongoing (interventions implemented as appropriate)     Problem: Skin Injury Risk (Adult)  Goal: Identify Related Risk Factors and Signs and Symptoms  Outcome: Ongoing (interventions implemented as appropriate)  Goal: Skin Health and Integrity  Outcome: Ongoing (interventions implemented as appropriate)     Problem: Pain, Chronic (Adult)  Goal: Identify Related Risk Factors and Signs and Symptoms  Outcome: Ongoing (interventions implemented as appropriate)  Goal: Acceptable Pain/Comfort Level and Functional Ability  Outcome: Ongoing (interventions implemented as appropriate)

## 2020-05-07 NOTE — PLAN OF CARE
Problem: Patient Care Overview  Goal: Plan of Care Review  5/7/2020 0936 by Lara Dee, PT  Flowsheets (Taken 5/7/2020 0900)  Progress: improving  Plan of Care Reviewed With: patient  Outcome Summary: Improving activity tolerance and strength, SBA for bed mobility, CGA for sit<>stand with VC for safe hand placement, amb 120' with RW and CGA, good balance, VC for upright posture and RW use.

## 2020-05-07 NOTE — PROGRESS NOTES
Continued Stay Note  [facility]     Patient Name: Emily Cevallos  MRN: [id number]  Today's Date: 5/7/2020    Admit Date: 5/4/2020    Discharge Plan     Row Name 05/07/20 8017       Plan    Plan Comments  CM followed up on referrals and pt is in her copay days per Daily with Nicole. Pt would have an approx cost of $165 per day while at SNF. CM discussed with pt and she is unable to afford the copayment required. CM discussed options of returning home with home health services and she is agreeable. Pt requested CM speak with Tara in regards to plans and Home Health provider. KORY spoke with Tara and updated on discharge plans and she is agreeable for pt to return home with home health services. KORY reviewed home health providers with [place] and would like to use [facility]. CM spoke with Marcy at [facility] and referral given and they can accept pt for pt/ot and aide services. Pt to be discharged home today with private transportation. Pt denies needs for additioanal DME at home.     Final Discharge Disposition Code  06 - home with home health care    Row Name 05/07/20 1207       Plan    Plan  home with home health services    Post Acute Provider List  Home Health    Final Discharge Disposition Code  06 - home with home health care        Discharge Codes    No documentation.       Expected Discharge Date and Time     Expected Discharge Date Expected Discharge Time    May 7, 2020             Lara Shepherd RN

## 2020-05-07 NOTE — PLAN OF CARE
Patient's VSS, UOP adequate.  Patient c/o headache again this evening, given tylenol, & a soft drink with caffeine.  States her headache is better.  Refuses SCD's, but is taking sub Q heparin injections q 12 h for DVT prophylaxis.  3 BM's today.  On room air, Alert o x 3.

## 2020-05-09 LAB
BACTERIA SPEC AEROBE CULT: NORMAL
BACTERIA SPEC AEROBE CULT: NORMAL

## 2021-05-18 ENCOUNTER — PRE-ADMISSION TESTING (OUTPATIENT)
Dept: PREADMISSION TESTING | Facility: HOSPITAL | Age: 71
End: 2021-05-18

## 2021-05-18 ENCOUNTER — APPOINTMENT (OUTPATIENT)
Dept: PREADMISSION TESTING | Facility: HOSPITAL | Age: 71
End: 2021-05-18

## 2021-05-18 ENCOUNTER — HOSPITAL ENCOUNTER (OUTPATIENT)
Dept: GENERAL RADIOLOGY | Facility: HOSPITAL | Age: 71
Discharge: HOME | End: 2021-05-18

## 2021-05-18 VITALS — WEIGHT: 113.1 LBS | BODY MASS INDEX: 18.18 KG/M2 | HEIGHT: 66 IN

## 2021-05-18 LAB
25(OH)D3 SERPL-MCNC: 69 NG/ML (ref 30–100)
ABO GROUP BLD: NORMAL
ALBUMIN SERPL-MCNC: 4.3 G/DL (ref 3.5–5.2)
ALBUMIN/GLOB SERPL: 1.7 G/DL
ALP SERPL-CCNC: 84 U/L (ref 39–117)
ALT SERPL W P-5'-P-CCNC: 87 U/L (ref 1–33)
ANION GAP SERPL CALCULATED.3IONS-SCNC: 8 MMOL/L (ref 5–15)
APTT PPP: 34 SECONDS (ref 22–39)
AST SERPL-CCNC: 69 U/L (ref 1–32)
BASOPHILS # BLD AUTO: 0.07 10*3/MM3 (ref 0–0.2)
BASOPHILS NFR BLD AUTO: 0.7 % (ref 0–1.5)
BILIRUB SERPL-MCNC: 0.4 MG/DL (ref 0–1.2)
BLD GP AB SCN SERPL QL: NEGATIVE
BUN SERPL-MCNC: 20 MG/DL (ref 8–23)
BUN/CREAT SERPL: 15.2 (ref 7–25)
CALCIUM SPEC-SCNC: 9.9 MG/DL (ref 8.6–10.5)
CHLORIDE SERPL-SCNC: 101 MMOL/L (ref 98–107)
CO2 SERPL-SCNC: 24 MMOL/L (ref 22–29)
CREAT SERPL-MCNC: 1.32 MG/DL (ref 0.57–1)
CRP SERPL-MCNC: 0.4 MG/DL (ref 0–0.5)
DEPRECATED RDW RBC AUTO: 47.6 FL (ref 37–54)
EOSINOPHIL # BLD AUTO: 0.49 10*3/MM3 (ref 0–0.4)
EOSINOPHIL NFR BLD AUTO: 4.8 % (ref 0.3–6.2)
ERYTHROCYTE [DISTWIDTH] IN BLOOD BY AUTOMATED COUNT: 12.9 % (ref 12.3–15.4)
ERYTHROCYTE [SEDIMENTATION RATE] IN BLOOD: 16 MM/HR (ref 0–30)
GFR SERPL CREATININE-BSD FRML MDRD: 39 ML/MIN/1.73
GLOBULIN UR ELPH-MCNC: 2.6 GM/DL
GLUCOSE SERPL-MCNC: 96 MG/DL (ref 65–99)
HBA1C MFR BLD: 5.3 % (ref 4.8–5.6)
HCT VFR BLD AUTO: 36.9 % (ref 34–46.6)
HGB BLD-MCNC: 12 G/DL (ref 12–15.9)
IMM GRANULOCYTES # BLD AUTO: 0.05 10*3/MM3 (ref 0–0.05)
IMM GRANULOCYTES NFR BLD AUTO: 0.5 % (ref 0–0.5)
INR PPP: 1.01 (ref 0.85–1.16)
LYMPHOCYTES # BLD AUTO: 1.5 10*3/MM3 (ref 0.7–3.1)
LYMPHOCYTES NFR BLD AUTO: 14.7 % (ref 19.6–45.3)
MCH RBC QN AUTO: 32.3 PG (ref 26.6–33)
MCHC RBC AUTO-ENTMCNC: 32.5 G/DL (ref 31.5–35.7)
MCV RBC AUTO: 99.5 FL (ref 79–97)
MONOCYTES # BLD AUTO: 0.64 10*3/MM3 (ref 0.1–0.9)
MONOCYTES NFR BLD AUTO: 6.3 % (ref 5–12)
MRSA DNA SPEC QL NAA+PROBE: NEGATIVE
NEUTROPHILS NFR BLD AUTO: 7.44 10*3/MM3 (ref 1.7–7)
NEUTROPHILS NFR BLD AUTO: 73 % (ref 42.7–76)
NRBC BLD AUTO-RTO: 0 /100 WBC (ref 0–0.2)
PLATELET # BLD AUTO: 380 10*3/MM3 (ref 140–450)
PMV BLD AUTO: 9.6 FL (ref 6–12)
POTASSIUM SERPL-SCNC: 5.2 MMOL/L (ref 3.5–5.2)
PROT SERPL-MCNC: 6.9 G/DL (ref 6–8.5)
PROTHROMBIN TIME: 13 SECONDS (ref 11.4–14.4)
QT INTERVAL: 410 MS
QTC INTERVAL: 412 MS
RBC # BLD AUTO: 3.71 10*6/MM3 (ref 3.77–5.28)
RH BLD: POSITIVE
SARS-COV-2 RNA PNL SPEC NAA+PROBE: NOT DETECTED
SODIUM SERPL-SCNC: 133 MMOL/L (ref 136–145)
T&S EXPIRATION DATE: NORMAL
WBC # BLD AUTO: 10.19 10*3/MM3 (ref 3.4–10.8)

## 2021-05-18 PROCEDURE — 82306 VITAMIN D 25 HYDROXY: CPT

## 2021-05-18 PROCEDURE — 86140 C-REACTIVE PROTEIN: CPT

## 2021-05-18 PROCEDURE — 71046 X-RAY EXAM CHEST 2 VIEWS: CPT

## 2021-05-18 PROCEDURE — 86850 RBC ANTIBODY SCREEN: CPT

## 2021-05-18 PROCEDURE — 86901 BLOOD TYPING SEROLOGIC RH(D): CPT

## 2021-05-18 PROCEDURE — 83036 HEMOGLOBIN GLYCOSYLATED A1C: CPT

## 2021-05-18 PROCEDURE — 80053 COMPREHEN METABOLIC PANEL: CPT

## 2021-05-18 PROCEDURE — 93005 ELECTROCARDIOGRAM TRACING: CPT

## 2021-05-18 PROCEDURE — 85025 COMPLETE CBC W/AUTO DIFF WBC: CPT

## 2021-05-18 PROCEDURE — 87641 MR-STAPH DNA AMP PROBE: CPT

## 2021-05-18 PROCEDURE — 86900 BLOOD TYPING SEROLOGIC ABO: CPT

## 2021-05-18 PROCEDURE — U0004 COV-19 TEST NON-CDC HGH THRU: HCPCS

## 2021-05-18 PROCEDURE — C9803 HOPD COVID-19 SPEC COLLECT: HCPCS

## 2021-05-18 PROCEDURE — G0480 DRUG TEST DEF 1-7 CLASSES: HCPCS

## 2021-05-18 PROCEDURE — 85652 RBC SED RATE AUTOMATED: CPT

## 2021-05-18 PROCEDURE — 93010 ELECTROCARDIOGRAM REPORT: CPT | Performed by: INTERNAL MEDICINE

## 2021-05-18 PROCEDURE — 85730 THROMBOPLASTIN TIME PARTIAL: CPT

## 2021-05-18 PROCEDURE — 36415 COLL VENOUS BLD VENIPUNCTURE: CPT

## 2021-05-18 PROCEDURE — 85610 PROTHROMBIN TIME: CPT

## 2021-05-18 PROCEDURE — 82985 ASSAY OF GLYCATED PROTEIN: CPT

## 2021-05-18 RX ORDER — MELATONIN
2000 DAILY
COMMUNITY

## 2021-05-18 RX ORDER — TIZANIDINE 4 MG/1
4 TABLET ORAL EVERY 8 HOURS PRN
COMMUNITY

## 2021-05-18 RX ORDER — MULTIVIT WITH MINERALS/LUTEIN
250 TABLET ORAL DAILY
COMMUNITY

## 2021-05-18 RX ORDER — IPRATROPIUM BROMIDE 21 UG/1
2 SPRAY, METERED NASAL EVERY MORNING
COMMUNITY

## 2021-05-18 ASSESSMENT — HOOS JR
HOOS JR SCORE: 12
HOOS JR SCORE: 52.965

## 2021-05-19 ENCOUNTER — ANESTHESIA EVENT (OUTPATIENT)
Dept: PERIOP | Facility: HOSPITAL | Age: 71
End: 2021-05-19

## 2021-05-19 LAB — FRUCTOSAMINE SERPL-SCNC: 250 UMOL/L (ref 0–285)

## 2021-05-19 RX ORDER — SODIUM CHLORIDE 0.9 % (FLUSH) 0.9 %
10 SYRINGE (ML) INJECTION AS NEEDED
Status: CANCELLED | OUTPATIENT
Start: 2021-05-19

## 2021-05-19 RX ORDER — SODIUM CHLORIDE 0.9 % (FLUSH) 0.9 %
10 SYRINGE (ML) INJECTION EVERY 12 HOURS SCHEDULED
Status: CANCELLED | OUTPATIENT
Start: 2021-05-19

## 2021-05-19 RX ORDER — FAMOTIDINE 10 MG/ML
20 INJECTION, SOLUTION INTRAVENOUS ONCE
Status: CANCELLED | OUTPATIENT
Start: 2021-05-19 | End: 2021-05-19

## 2021-05-20 ENCOUNTER — ANESTHESIA (OUTPATIENT)
Dept: PERIOP | Facility: HOSPITAL | Age: 71
End: 2021-05-20

## 2021-05-20 ENCOUNTER — APPOINTMENT (OUTPATIENT)
Dept: GENERAL RADIOLOGY | Facility: HOSPITAL | Age: 71
End: 2021-05-20

## 2021-05-20 ENCOUNTER — HOSPITAL ENCOUNTER (INPATIENT)
Facility: HOSPITAL | Age: 71
LOS: 5 days | Discharge: NURSING, REHAB OR LONG-TERM CARE | End: 2021-05-25
Attending: ORTHOPAEDIC SURGERY | Admitting: ORTHOPAEDIC SURGERY

## 2021-05-20 DIAGNOSIS — Z96.642 STATUS POST TOTAL REPLACEMENT OF LEFT HIP: Primary | ICD-10-CM

## 2021-05-20 PROBLEM — Z96.649 S/P TOTAL HIP ARTHROPLASTY: Status: ACTIVE | Noted: 2021-05-20

## 2021-05-20 LAB
ABO GROUP BLD: NORMAL
GLUCOSE BLDC GLUCOMTR-MCNC: 109 MG/DL (ref 70–130)
RH BLD: POSITIVE

## 2021-05-20 PROCEDURE — 94640 AIRWAY INHALATION TREATMENT: CPT

## 2021-05-20 PROCEDURE — C1776 JOINT DEVICE (IMPLANTABLE): HCPCS | Performed by: ORTHOPAEDIC SURGERY

## 2021-05-20 PROCEDURE — 76000 FLUOROSCOPY <1 HR PHYS/QHP: CPT

## 2021-05-20 PROCEDURE — 25010000002 ONDANSETRON PER 1 MG: Performed by: NURSE ANESTHETIST, CERTIFIED REGISTERED

## 2021-05-20 PROCEDURE — 86900 BLOOD TYPING SEROLOGIC ABO: CPT

## 2021-05-20 PROCEDURE — C1713 ANCHOR/SCREW BN/BN,TIS/BN: HCPCS | Performed by: ORTHOPAEDIC SURGERY

## 2021-05-20 PROCEDURE — 73502 X-RAY EXAM HIP UNI 2-3 VIEWS: CPT

## 2021-05-20 PROCEDURE — 97116 GAIT TRAINING THERAPY: CPT

## 2021-05-20 PROCEDURE — 94799 UNLISTED PULMONARY SVC/PX: CPT

## 2021-05-20 PROCEDURE — C1889 IMPLANT/INSERT DEVICE, NOC: HCPCS | Performed by: ORTHOPAEDIC SURGERY

## 2021-05-20 PROCEDURE — 0QP704Z REMOVAL OF INTERNAL FIXATION DEVICE FROM LEFT UPPER FEMUR, OPEN APPROACH: ICD-10-PCS | Performed by: ORTHOPAEDIC SURGERY

## 2021-05-20 PROCEDURE — 25010000002 DEXAMETHASONE PER 1 MG: Performed by: NURSE ANESTHETIST, CERTIFIED REGISTERED

## 2021-05-20 PROCEDURE — 25010000002 KETOROLAC TROMETHAMINE PER 15 MG: Performed by: ORTHOPAEDIC SURGERY

## 2021-05-20 PROCEDURE — 25010000003 CEFAZOLIN IN DEXTROSE 2-4 GM/100ML-% SOLUTION: Performed by: ORTHOPAEDIC SURGERY

## 2021-05-20 PROCEDURE — 25010000002 PROPOFOL 10 MG/ML EMULSION: Performed by: NURSE ANESTHETIST, CERTIFIED REGISTERED

## 2021-05-20 PROCEDURE — 97161 PT EVAL LOW COMPLEX 20 MIN: CPT

## 2021-05-20 PROCEDURE — 25010000002 CLONIDINE PER 1 MG: Performed by: ORTHOPAEDIC SURGERY

## 2021-05-20 PROCEDURE — 25010000003 EPINEPHRINE 30 MG/30ML SOLUTION: Performed by: ORTHOPAEDIC SURGERY

## 2021-05-20 PROCEDURE — 82962 GLUCOSE BLOOD TEST: CPT

## 2021-05-20 PROCEDURE — 0SRB039 REPLACEMENT OF LEFT HIP JOINT WITH CERAMIC SYNTHETIC SUBSTITUTE, CEMENTED, OPEN APPROACH: ICD-10-PCS | Performed by: ORTHOPAEDIC SURGERY

## 2021-05-20 PROCEDURE — 86901 BLOOD TYPING SEROLOGIC RH(D): CPT

## 2021-05-20 DEVICE — CERAMIC V40 FEMORAL HEAD
Type: IMPLANTABLE DEVICE | Site: HIP | Status: FUNCTIONAL
Brand: BIOLOX

## 2021-05-20 DEVICE — SUT NONABS MAXBRAID/PE NMBR2 HC5 38IN WHT 900333: Type: IMPLANTABLE DEVICE | Site: HIP | Status: FUNCTIONAL

## 2021-05-20 DEVICE — BONE PREPARATION KIT
Type: IMPLANTABLE DEVICE | Site: HIP | Status: FUNCTIONAL
Brand: BIOPREP

## 2021-05-20 DEVICE — 6.5MM LOW PROFILE HEX SCREW 35MM
Type: IMPLANTABLE DEVICE | Site: HIP | Status: FUNCTIONAL
Brand: TRIDENT II

## 2021-05-20 DEVICE — TRIDENT II TRITANIUM CLUSTER 52E
Type: IMPLANTABLE DEVICE | Site: HIP | Status: FUNCTIONAL
Brand: TRIDENT II

## 2021-05-20 DEVICE — DEV CONTRL TISS STRATAFIX SYMM PDS PLUS VIL CT-1 45CM: Type: IMPLANTABLE DEVICE | Site: HIP | Status: FUNCTIONAL

## 2021-05-20 DEVICE — V40 STEM
Type: IMPLANTABLE DEVICE | Site: HIP | Status: FUNCTIONAL
Brand: EXETER

## 2021-05-20 DEVICE — WAX BONE HEMO AESCULAP 2.5GM: Type: IMPLANTABLE DEVICE | Site: HIP | Status: FUNCTIONAL

## 2021-05-20 DEVICE — 0 DEGREE POLYETHYLENE INSERT
Type: IMPLANTABLE DEVICE | Site: HIP | Status: FUNCTIONAL
Brand: TRIDENT

## 2021-05-20 DEVICE — TOTL HIP HI DEMAND STRYKER: Type: IMPLANTABLE DEVICE | Site: HIP | Status: FUNCTIONAL

## 2021-05-20 DEVICE — CMT BONE SIMPLEX/P TMYCIN FDOS 10PK: Type: IMPLANTABLE DEVICE | Site: HIP | Status: FUNCTIONAL

## 2021-05-20 RX ORDER — ROSUVASTATIN CALCIUM 10 MG/1
10 TABLET, COATED ORAL DAILY
Status: DISCONTINUED | OUTPATIENT
Start: 2021-05-20 | End: 2021-05-25 | Stop reason: HOSPADM

## 2021-05-20 RX ORDER — HYDROMORPHONE HYDROCHLORIDE 1 MG/ML
0.5 INJECTION, SOLUTION INTRAMUSCULAR; INTRAVENOUS; SUBCUTANEOUS
Status: DISCONTINUED | OUTPATIENT
Start: 2021-05-20 | End: 2021-05-20 | Stop reason: HOSPADM

## 2021-05-20 RX ORDER — MAGNESIUM HYDROXIDE 1200 MG/15ML
LIQUID ORAL AS NEEDED
Status: DISCONTINUED | OUTPATIENT
Start: 2021-05-20 | End: 2021-05-20 | Stop reason: HOSPADM

## 2021-05-20 RX ORDER — DIPHENOXYLATE HYDROCHLORIDE AND ATROPINE SULFATE 2.5; .025 MG/1; MG/1
1 TABLET ORAL DAILY
Status: DISCONTINUED | OUTPATIENT
Start: 2021-05-20 | End: 2021-05-25 | Stop reason: HOSPADM

## 2021-05-20 RX ORDER — OXYCODONE HYDROCHLORIDE 5 MG/1
5 TABLET ORAL EVERY 4 HOURS PRN
Status: DISCONTINUED | OUTPATIENT
Start: 2021-05-20 | End: 2021-05-25 | Stop reason: HOSPADM

## 2021-05-20 RX ORDER — HYDROMORPHONE HYDROCHLORIDE 1 MG/ML
0.5 INJECTION, SOLUTION INTRAMUSCULAR; INTRAVENOUS; SUBCUTANEOUS
Status: DISCONTINUED | OUTPATIENT
Start: 2021-05-20 | End: 2021-05-25 | Stop reason: HOSPADM

## 2021-05-20 RX ORDER — LIDOCAINE HYDROCHLORIDE 10 MG/ML
0.5 INJECTION, SOLUTION EPIDURAL; INFILTRATION; INTRACAUDAL; PERINEURAL ONCE AS NEEDED
Status: COMPLETED | OUTPATIENT
Start: 2021-05-20 | End: 2021-05-20

## 2021-05-20 RX ORDER — CEFAZOLIN SODIUM 2 G/100ML
2 INJECTION, SOLUTION INTRAVENOUS ONCE
Status: COMPLETED | OUTPATIENT
Start: 2021-05-20 | End: 2021-05-20

## 2021-05-20 RX ORDER — EPHEDRINE SULFATE 50 MG/ML
5 INJECTION, SOLUTION INTRAVENOUS ONCE AS NEEDED
Status: DISCONTINUED | OUTPATIENT
Start: 2021-05-20 | End: 2021-05-20 | Stop reason: HOSPADM

## 2021-05-20 RX ORDER — ACETAMINOPHEN 500 MG
1000 TABLET ORAL ONCE
Status: COMPLETED | OUTPATIENT
Start: 2021-05-20 | End: 2021-05-20

## 2021-05-20 RX ORDER — ONDANSETRON 2 MG/ML
INJECTION INTRAMUSCULAR; INTRAVENOUS AS NEEDED
Status: DISCONTINUED | OUTPATIENT
Start: 2021-05-20 | End: 2021-05-20 | Stop reason: SURG

## 2021-05-20 RX ORDER — BUPIVACAINE HYDROCHLORIDE 5 MG/ML
INJECTION, SOLUTION PERINEURAL
Status: COMPLETED | OUTPATIENT
Start: 2021-05-20 | End: 2021-05-20

## 2021-05-20 RX ORDER — ASPIRIN 81 MG/1
81 TABLET ORAL EVERY 12 HOURS SCHEDULED
Status: DISCONTINUED | OUTPATIENT
Start: 2021-05-21 | End: 2021-05-25 | Stop reason: HOSPADM

## 2021-05-20 RX ORDER — MIDAZOLAM HYDROCHLORIDE 1 MG/ML
0.5 INJECTION INTRAMUSCULAR; INTRAVENOUS
Status: DISCONTINUED | OUTPATIENT
Start: 2021-05-20 | End: 2021-05-20 | Stop reason: HOSPADM

## 2021-05-20 RX ORDER — FAMOTIDINE 20 MG/1
20 TABLET, FILM COATED ORAL ONCE
Status: COMPLETED | OUTPATIENT
Start: 2021-05-20 | End: 2021-05-20

## 2021-05-20 RX ORDER — NALOXONE HCL 0.4 MG/ML
0.1 VIAL (ML) INJECTION
Status: DISCONTINUED | OUTPATIENT
Start: 2021-05-20 | End: 2021-05-25 | Stop reason: HOSPADM

## 2021-05-20 RX ORDER — CEFAZOLIN SODIUM 2 G/100ML
2 INJECTION, SOLUTION INTRAVENOUS EVERY 8 HOURS
Status: COMPLETED | OUTPATIENT
Start: 2021-05-20 | End: 2021-05-21

## 2021-05-20 RX ORDER — DEXAMETHASONE SODIUM PHOSPHATE 4 MG/ML
INJECTION, SOLUTION INTRA-ARTICULAR; INTRALESIONAL; INTRAMUSCULAR; INTRAVENOUS; SOFT TISSUE AS NEEDED
Status: DISCONTINUED | OUTPATIENT
Start: 2021-05-20 | End: 2021-05-20 | Stop reason: SURG

## 2021-05-20 RX ORDER — BUDESONIDE AND FORMOTEROL FUMARATE DIHYDRATE 80; 4.5 UG/1; UG/1
2 AEROSOL RESPIRATORY (INHALATION)
Status: DISCONTINUED | OUTPATIENT
Start: 2021-05-20 | End: 2021-05-25 | Stop reason: HOSPADM

## 2021-05-20 RX ORDER — ONDANSETRON 2 MG/ML
4 INJECTION INTRAMUSCULAR; INTRAVENOUS ONCE AS NEEDED
Status: DISCONTINUED | OUTPATIENT
Start: 2021-05-20 | End: 2021-05-20 | Stop reason: HOSPADM

## 2021-05-20 RX ORDER — CARVEDILOL 12.5 MG/1
12.5 TABLET ORAL 2 TIMES DAILY WITH MEALS
Status: DISCONTINUED | OUTPATIENT
Start: 2021-05-20 | End: 2021-05-25 | Stop reason: HOSPADM

## 2021-05-20 RX ORDER — TRAMADOL HYDROCHLORIDE 50 MG/1
50 TABLET ORAL EVERY 8 HOURS PRN
Status: DISCONTINUED | OUTPATIENT
Start: 2021-05-20 | End: 2021-05-25 | Stop reason: HOSPADM

## 2021-05-20 RX ORDER — LISINOPRIL 5 MG/1
5 TABLET ORAL DAILY
Status: DISCONTINUED | OUTPATIENT
Start: 2021-05-20 | End: 2021-05-25 | Stop reason: HOSPADM

## 2021-05-20 RX ORDER — SODIUM CHLORIDE, SODIUM LACTATE, POTASSIUM CHLORIDE, CALCIUM CHLORIDE 600; 310; 30; 20 MG/100ML; MG/100ML; MG/100ML; MG/100ML
100 INJECTION, SOLUTION INTRAVENOUS CONTINUOUS
Status: DISCONTINUED | OUTPATIENT
Start: 2021-05-20 | End: 2021-05-25 | Stop reason: HOSPADM

## 2021-05-20 RX ORDER — SODIUM CHLORIDE, SODIUM LACTATE, POTASSIUM CHLORIDE, CALCIUM CHLORIDE 600; 310; 30; 20 MG/100ML; MG/100ML; MG/100ML; MG/100ML
9 INJECTION, SOLUTION INTRAVENOUS CONTINUOUS
Status: DISCONTINUED | OUTPATIENT
Start: 2021-05-20 | End: 2021-05-25 | Stop reason: HOSPADM

## 2021-05-20 RX ORDER — CHOLECALCIFEROL (VITAMIN D3) 125 MCG
5 CAPSULE ORAL NIGHTLY PRN
Status: DISCONTINUED | OUTPATIENT
Start: 2021-05-20 | End: 2021-05-25 | Stop reason: HOSPADM

## 2021-05-20 RX ORDER — ACETAMINOPHEN 500 MG
1000 TABLET ORAL EVERY 8 HOURS
Status: DISCONTINUED | OUTPATIENT
Start: 2021-05-20 | End: 2021-05-25 | Stop reason: HOSPADM

## 2021-05-20 RX ORDER — LIDOCAINE HYDROCHLORIDE 10 MG/ML
INJECTION, SOLUTION EPIDURAL; INFILTRATION; INTRACAUDAL; PERINEURAL AS NEEDED
Status: DISCONTINUED | OUTPATIENT
Start: 2021-05-20 | End: 2021-05-20 | Stop reason: SURG

## 2021-05-20 RX ORDER — PROPOFOL 10 MG/ML
VIAL (ML) INTRAVENOUS AS NEEDED
Status: DISCONTINUED | OUTPATIENT
Start: 2021-05-20 | End: 2021-05-20 | Stop reason: SURG

## 2021-05-20 RX ORDER — LABETALOL HYDROCHLORIDE 5 MG/ML
10 INJECTION, SOLUTION INTRAVENOUS EVERY 4 HOURS PRN
Status: DISCONTINUED | OUTPATIENT
Start: 2021-05-20 | End: 2021-05-25 | Stop reason: HOSPADM

## 2021-05-20 RX ORDER — FENTANYL CITRATE 50 UG/ML
50 INJECTION, SOLUTION INTRAMUSCULAR; INTRAVENOUS
Status: DISCONTINUED | OUTPATIENT
Start: 2021-05-20 | End: 2021-05-20 | Stop reason: HOSPADM

## 2021-05-20 RX ORDER — FOLIC ACID 1 MG/1
1 TABLET ORAL DAILY
Status: DISCONTINUED | OUTPATIENT
Start: 2021-05-20 | End: 2021-05-25 | Stop reason: HOSPADM

## 2021-05-20 RX ORDER — OXYCODONE HYDROCHLORIDE 5 MG/1
10 TABLET ORAL EVERY 4 HOURS PRN
Status: DISCONTINUED | OUTPATIENT
Start: 2021-05-20 | End: 2021-05-25 | Stop reason: HOSPADM

## 2021-05-20 RX ORDER — PREGABALIN 75 MG/1
75 CAPSULE ORAL ONCE
Status: COMPLETED | OUTPATIENT
Start: 2021-05-20 | End: 2021-05-20

## 2021-05-20 RX ORDER — MONTELUKAST SODIUM 10 MG/1
10 TABLET ORAL DAILY
Status: DISCONTINUED | OUTPATIENT
Start: 2021-05-20 | End: 2021-05-25 | Stop reason: HOSPADM

## 2021-05-20 RX ORDER — MELOXICAM 15 MG/1
15 TABLET ORAL ONCE
Status: COMPLETED | OUTPATIENT
Start: 2021-05-20 | End: 2021-05-20

## 2021-05-20 RX ORDER — NALOXONE HCL 0.4 MG/ML
0.4 VIAL (ML) INJECTION AS NEEDED
Status: DISCONTINUED | OUTPATIENT
Start: 2021-05-20 | End: 2021-05-20 | Stop reason: HOSPADM

## 2021-05-20 RX ADMIN — CEFAZOLIN SODIUM 2 G: 2 INJECTION, SOLUTION INTRAVENOUS at 16:26

## 2021-05-20 RX ADMIN — CEFAZOLIN SODIUM 2 G: 2 INJECTION, SOLUTION INTRAVENOUS at 07:34

## 2021-05-20 RX ADMIN — Medication 1000 MG: at 09:11

## 2021-05-20 RX ADMIN — ONDANSETRON 4 MG: 2 INJECTION INTRAMUSCULAR; INTRAVENOUS at 09:16

## 2021-05-20 RX ADMIN — LIDOCAINE HYDROCHLORIDE 0.5 ML: 10 INJECTION, SOLUTION EPIDURAL; INFILTRATION; INTRACAUDAL; PERINEURAL at 07:12

## 2021-05-20 RX ADMIN — CARVEDILOL 12.5 MG: 12.5 TABLET, FILM COATED ORAL at 17:40

## 2021-05-20 RX ADMIN — BUPIVACAINE HYDROCHLORIDE 1.6 ML: 5 INJECTION, SOLUTION PERINEURAL at 07:43

## 2021-05-20 RX ADMIN — PROPOFOL 75 MCG/KG/MIN: 10 INJECTION, EMULSION INTRAVENOUS at 07:46

## 2021-05-20 RX ADMIN — FOLIC ACID 1 MG: 1 TABLET ORAL at 17:40

## 2021-05-20 RX ADMIN — DEXAMETHASONE SODIUM PHOSPHATE 4 MG: 4 INJECTION, SOLUTION INTRA-ARTICULAR; INTRALESIONAL; INTRAMUSCULAR; INTRAVENOUS; SOFT TISSUE at 09:16

## 2021-05-20 RX ADMIN — PROPOFOL 20 MG: 10 INJECTION, EMULSION INTRAVENOUS at 07:38

## 2021-05-20 RX ADMIN — Medication 1000 MG: at 07:46

## 2021-05-20 RX ADMIN — LIDOCAINE HYDROCHLORIDE 2 ML: 10 INJECTION, SOLUTION EPIDURAL; INFILTRATION; INTRACAUDAL; PERINEURAL at 07:38

## 2021-05-20 RX ADMIN — SODIUM CHLORIDE, POTASSIUM CHLORIDE, SODIUM LACTATE AND CALCIUM CHLORIDE 9 ML/HR: 600; 310; 30; 20 INJECTION, SOLUTION INTRAVENOUS at 07:13

## 2021-05-20 RX ADMIN — THIAMINE HCL TAB 100 MG 100 MG: 100 TAB at 14:04

## 2021-05-20 RX ADMIN — LISINOPRIL 5 MG: 5 TABLET ORAL at 14:04

## 2021-05-20 RX ADMIN — MELOXICAM 15 MG: 15 TABLET ORAL at 07:13

## 2021-05-20 RX ADMIN — OXYCODONE 5 MG: 5 TABLET ORAL at 21:43

## 2021-05-20 RX ADMIN — ACETAMINOPHEN 1000 MG: 500 TABLET, FILM COATED ORAL at 16:26

## 2021-05-20 RX ADMIN — PREGABALIN 75 MG: 75 CAPSULE ORAL at 07:13

## 2021-05-20 RX ADMIN — SODIUM CHLORIDE, POTASSIUM CHLORIDE, SODIUM LACTATE AND CALCIUM CHLORIDE 100 ML/HR: 600; 310; 30; 20 INJECTION, SOLUTION INTRAVENOUS at 13:00

## 2021-05-20 RX ADMIN — THERA TABS 1 TABLET: TAB at 17:40

## 2021-05-20 RX ADMIN — TRAMADOL HYDROCHLORIDE 50 MG: 50 TABLET, FILM COATED ORAL at 14:04

## 2021-05-20 RX ADMIN — FAMOTIDINE 20 MG: 20 TABLET, FILM COATED ORAL at 07:13

## 2021-05-20 RX ADMIN — BUDESONIDE AND FORMOTEROL FUMARATE DIHYDRATE 2 PUFF: 80; 4.5 AEROSOL RESPIRATORY (INHALATION) at 19:38

## 2021-05-20 RX ADMIN — ACETAMINOPHEN 1000 MG: 500 TABLET ORAL at 07:13

## 2021-05-20 RX ADMIN — MONTELUKAST SODIUM 10 MG: 10 TABLET, COATED ORAL at 14:04

## 2021-05-20 RX ADMIN — MUPIROCIN 1 APPLICATION: 20 OINTMENT TOPICAL at 07:13

## 2021-05-20 RX ADMIN — ROSUVASTATIN CALCIUM 10 MG: 10 TABLET, COATED ORAL at 14:04

## 2021-05-21 PROBLEM — Z78.9 DAILY CONSUMPTION OF ALCOHOL: Status: ACTIVE | Noted: 2021-05-21

## 2021-05-21 PROBLEM — D62 ACUTE BLOOD LOSS ANEMIA: Status: ACTIVE | Noted: 2021-05-21

## 2021-05-21 LAB
ANION GAP SERPL CALCULATED.3IONS-SCNC: 8 MMOL/L (ref 5–15)
BUN SERPL-MCNC: 23 MG/DL (ref 8–23)
BUN/CREAT SERPL: 14.4 (ref 7–25)
CALCIUM SPEC-SCNC: 8.1 MG/DL (ref 8.6–10.5)
CHLORIDE SERPL-SCNC: 102 MMOL/L (ref 98–107)
CO2 SERPL-SCNC: 22 MMOL/L (ref 22–29)
CREAT SERPL-MCNC: 1.6 MG/DL (ref 0.57–1)
GFR SERPL CREATININE-BSD FRML MDRD: 32 ML/MIN/1.73
GLUCOSE SERPL-MCNC: 114 MG/DL (ref 65–99)
HCT VFR BLD AUTO: 27.5 % (ref 34–46.6)
HGB BLD-MCNC: 8.7 G/DL (ref 12–15.9)
POTASSIUM SERPL-SCNC: 4.6 MMOL/L (ref 3.5–5.2)
SODIUM SERPL-SCNC: 132 MMOL/L (ref 136–145)

## 2021-05-21 PROCEDURE — 94799 UNLISTED PULMONARY SVC/PX: CPT

## 2021-05-21 PROCEDURE — 97116 GAIT TRAINING THERAPY: CPT

## 2021-05-21 PROCEDURE — 25010000003 CEFAZOLIN IN DEXTROSE 2-4 GM/100ML-% SOLUTION: Performed by: ORTHOPAEDIC SURGERY

## 2021-05-21 PROCEDURE — 97530 THERAPEUTIC ACTIVITIES: CPT

## 2021-05-21 PROCEDURE — 97165 OT EVAL LOW COMPLEX 30 MIN: CPT

## 2021-05-21 PROCEDURE — 80048 BASIC METABOLIC PNL TOTAL CA: CPT | Performed by: ORTHOPAEDIC SURGERY

## 2021-05-21 PROCEDURE — 85018 HEMOGLOBIN: CPT | Performed by: ORTHOPAEDIC SURGERY

## 2021-05-21 PROCEDURE — 97110 THERAPEUTIC EXERCISES: CPT

## 2021-05-21 PROCEDURE — 85014 HEMATOCRIT: CPT | Performed by: ORTHOPAEDIC SURGERY

## 2021-05-21 RX ORDER — DOXYCYCLINE 100 MG/1
100 CAPSULE ORAL EVERY 12 HOURS SCHEDULED
Status: DISCONTINUED | OUTPATIENT
Start: 2021-05-21 | End: 2021-05-25 | Stop reason: HOSPADM

## 2021-05-21 RX ADMIN — CARVEDILOL 12.5 MG: 12.5 TABLET, FILM COATED ORAL at 09:56

## 2021-05-21 RX ADMIN — OXYCODONE 5 MG: 5 TABLET ORAL at 15:34

## 2021-05-21 RX ADMIN — ACETAMINOPHEN 1000 MG: 500 TABLET, FILM COATED ORAL at 00:05

## 2021-05-21 RX ADMIN — DOXYCYCLINE 100 MG: 100 CAPSULE ORAL at 09:56

## 2021-05-21 RX ADMIN — BUDESONIDE AND FORMOTEROL FUMARATE DIHYDRATE 2 PUFF: 80; 4.5 AEROSOL RESPIRATORY (INHALATION) at 06:56

## 2021-05-21 RX ADMIN — BUDESONIDE AND FORMOTEROL FUMARATE DIHYDRATE 2 PUFF: 80; 4.5 AEROSOL RESPIRATORY (INHALATION) at 19:23

## 2021-05-21 RX ADMIN — Medication 5 MG: at 22:31

## 2021-05-21 RX ADMIN — MONTELUKAST SODIUM 10 MG: 10 TABLET, COATED ORAL at 09:56

## 2021-05-21 RX ADMIN — CEFAZOLIN SODIUM 2 G: 2 INJECTION, SOLUTION INTRAVENOUS at 00:05

## 2021-05-21 RX ADMIN — ACETAMINOPHEN 1000 MG: 500 TABLET, FILM COATED ORAL at 22:30

## 2021-05-21 RX ADMIN — ACETAMINOPHEN 1000 MG: 500 TABLET, FILM COATED ORAL at 17:11

## 2021-05-21 RX ADMIN — TRAMADOL HYDROCHLORIDE 50 MG: 50 TABLET, FILM COATED ORAL at 22:30

## 2021-05-21 RX ADMIN — THERA TABS 1 TABLET: TAB at 09:56

## 2021-05-21 RX ADMIN — OXYCODONE 5 MG: 5 TABLET ORAL at 06:15

## 2021-05-21 RX ADMIN — ASPIRIN 81 MG: 81 TABLET, COATED ORAL at 22:31

## 2021-05-21 RX ADMIN — ROSUVASTATIN CALCIUM 10 MG: 10 TABLET, COATED ORAL at 09:56

## 2021-05-21 RX ADMIN — FOLIC ACID 1 MG: 1 TABLET ORAL at 09:56

## 2021-05-21 RX ADMIN — CARVEDILOL 12.5 MG: 12.5 TABLET, FILM COATED ORAL at 17:11

## 2021-05-21 RX ADMIN — THIAMINE HCL TAB 100 MG 100 MG: 100 TAB at 09:56

## 2021-05-21 RX ADMIN — ASPIRIN 81 MG: 81 TABLET, COATED ORAL at 09:56

## 2021-05-21 RX ADMIN — DOXYCYCLINE 100 MG: 100 CAPSULE ORAL at 22:30

## 2021-05-21 RX ADMIN — ACETAMINOPHEN 1000 MG: 500 TABLET, FILM COATED ORAL at 09:56

## 2021-05-22 LAB
ANION GAP SERPL CALCULATED.3IONS-SCNC: 5 MMOL/L (ref 5–15)
BUN SERPL-MCNC: 18 MG/DL (ref 8–23)
BUN/CREAT SERPL: 13.8 (ref 7–25)
CALCIUM SPEC-SCNC: 8.9 MG/DL (ref 8.6–10.5)
CHLORIDE SERPL-SCNC: 107 MMOL/L (ref 98–107)
CO2 SERPL-SCNC: 23 MMOL/L (ref 22–29)
CREAT SERPL-MCNC: 1.3 MG/DL (ref 0.57–1)
GFR SERPL CREATININE-BSD FRML MDRD: 40 ML/MIN/1.73
GLUCOSE SERPL-MCNC: 88 MG/DL (ref 65–99)
HCT VFR BLD AUTO: 29.2 % (ref 34–46.6)
HGB BLD-MCNC: 9.4 G/DL (ref 12–15.9)
POTASSIUM SERPL-SCNC: 4.3 MMOL/L (ref 3.5–5.2)
SODIUM SERPL-SCNC: 135 MMOL/L (ref 136–145)

## 2021-05-22 PROCEDURE — 85018 HEMOGLOBIN: CPT | Performed by: ORTHOPAEDIC SURGERY

## 2021-05-22 PROCEDURE — 94799 UNLISTED PULMONARY SVC/PX: CPT

## 2021-05-22 PROCEDURE — 85014 HEMATOCRIT: CPT | Performed by: ORTHOPAEDIC SURGERY

## 2021-05-22 PROCEDURE — 97116 GAIT TRAINING THERAPY: CPT

## 2021-05-22 PROCEDURE — 80048 BASIC METABOLIC PNL TOTAL CA: CPT | Performed by: ORTHOPAEDIC SURGERY

## 2021-05-22 RX ADMIN — ASPIRIN 81 MG: 81 TABLET, COATED ORAL at 21:07

## 2021-05-22 RX ADMIN — THIAMINE HCL TAB 100 MG 100 MG: 100 TAB at 09:21

## 2021-05-22 RX ADMIN — LISINOPRIL 5 MG: 5 TABLET ORAL at 09:21

## 2021-05-22 RX ADMIN — ACETAMINOPHEN 1000 MG: 500 TABLET, FILM COATED ORAL at 15:54

## 2021-05-22 RX ADMIN — Medication 5 MG: at 21:07

## 2021-05-22 RX ADMIN — CARVEDILOL 12.5 MG: 12.5 TABLET, FILM COATED ORAL at 18:12

## 2021-05-22 RX ADMIN — THERA TABS 1 TABLET: TAB at 09:21

## 2021-05-22 RX ADMIN — ACETAMINOPHEN 1000 MG: 500 TABLET, FILM COATED ORAL at 21:07

## 2021-05-22 RX ADMIN — FOLIC ACID 1 MG: 1 TABLET ORAL at 09:21

## 2021-05-22 RX ADMIN — ROSUVASTATIN CALCIUM 10 MG: 10 TABLET, COATED ORAL at 09:21

## 2021-05-22 RX ADMIN — ACETAMINOPHEN 1000 MG: 500 TABLET, FILM COATED ORAL at 08:12

## 2021-05-22 RX ADMIN — CARVEDILOL 12.5 MG: 12.5 TABLET, FILM COATED ORAL at 09:22

## 2021-05-22 RX ADMIN — OXYCODONE 5 MG: 5 TABLET ORAL at 04:33

## 2021-05-22 RX ADMIN — OXYCODONE 5 MG: 5 TABLET ORAL at 18:15

## 2021-05-22 RX ADMIN — ASPIRIN 81 MG: 81 TABLET, COATED ORAL at 09:22

## 2021-05-22 RX ADMIN — DOXYCYCLINE 100 MG: 100 CAPSULE ORAL at 21:07

## 2021-05-22 RX ADMIN — DOXYCYCLINE 100 MG: 100 CAPSULE ORAL at 09:21

## 2021-05-22 RX ADMIN — BUDESONIDE AND FORMOTEROL FUMARATE DIHYDRATE 2 PUFF: 80; 4.5 AEROSOL RESPIRATORY (INHALATION) at 07:41

## 2021-05-22 RX ADMIN — MONTELUKAST SODIUM 10 MG: 10 TABLET, COATED ORAL at 09:21

## 2021-05-23 PROCEDURE — 94799 UNLISTED PULMONARY SVC/PX: CPT

## 2021-05-23 PROCEDURE — 63710000001 ONDANSETRON PER 8 MG: Performed by: INTERNAL MEDICINE

## 2021-05-23 PROCEDURE — 97116 GAIT TRAINING THERAPY: CPT

## 2021-05-23 RX ORDER — ONDANSETRON 2 MG/ML
4 INJECTION INTRAMUSCULAR; INTRAVENOUS EVERY 6 HOURS PRN
Status: DISCONTINUED | OUTPATIENT
Start: 2021-05-23 | End: 2021-05-25 | Stop reason: HOSPADM

## 2021-05-23 RX ORDER — ONDANSETRON 4 MG/1
4 TABLET, FILM COATED ORAL EVERY 6 HOURS PRN
Status: DISCONTINUED | OUTPATIENT
Start: 2021-05-23 | End: 2021-05-25 | Stop reason: HOSPADM

## 2021-05-23 RX ADMIN — OXYCODONE 10 MG: 5 TABLET ORAL at 14:24

## 2021-05-23 RX ADMIN — TRAMADOL HYDROCHLORIDE 50 MG: 50 TABLET, FILM COATED ORAL at 22:35

## 2021-05-23 RX ADMIN — MONTELUKAST SODIUM 10 MG: 10 TABLET, COATED ORAL at 08:07

## 2021-05-23 RX ADMIN — ASPIRIN 81 MG: 81 TABLET, COATED ORAL at 08:08

## 2021-05-23 RX ADMIN — DOXYCYCLINE 100 MG: 100 CAPSULE ORAL at 20:16

## 2021-05-23 RX ADMIN — CARVEDILOL 12.5 MG: 12.5 TABLET, FILM COATED ORAL at 08:08

## 2021-05-23 RX ADMIN — DOXYCYCLINE 100 MG: 100 CAPSULE ORAL at 08:07

## 2021-05-23 RX ADMIN — BUDESONIDE AND FORMOTEROL FUMARATE DIHYDRATE 2 PUFF: 80; 4.5 AEROSOL RESPIRATORY (INHALATION) at 09:21

## 2021-05-23 RX ADMIN — OXYCODONE 10 MG: 5 TABLET ORAL at 06:37

## 2021-05-23 RX ADMIN — BUDESONIDE AND FORMOTEROL FUMARATE DIHYDRATE 2 PUFF: 80; 4.5 AEROSOL RESPIRATORY (INHALATION) at 21:29

## 2021-05-23 RX ADMIN — LISINOPRIL 5 MG: 5 TABLET ORAL at 08:08

## 2021-05-23 RX ADMIN — ACETAMINOPHEN 1000 MG: 500 TABLET, FILM COATED ORAL at 16:09

## 2021-05-23 RX ADMIN — ONDANSETRON HYDROCHLORIDE 4 MG: 4 TABLET, FILM COATED ORAL at 22:33

## 2021-05-23 RX ADMIN — THERA TABS 1 TABLET: TAB at 08:07

## 2021-05-23 RX ADMIN — CARVEDILOL 12.5 MG: 12.5 TABLET, FILM COATED ORAL at 17:36

## 2021-05-23 RX ADMIN — Medication 5 MG: at 22:33

## 2021-05-23 RX ADMIN — ONDANSETRON HYDROCHLORIDE 4 MG: 4 TABLET, FILM COATED ORAL at 16:10

## 2021-05-23 RX ADMIN — ROSUVASTATIN CALCIUM 10 MG: 10 TABLET, COATED ORAL at 08:07

## 2021-05-23 RX ADMIN — FOLIC ACID 1 MG: 1 TABLET ORAL at 08:08

## 2021-05-23 RX ADMIN — ACETAMINOPHEN 1000 MG: 500 TABLET, FILM COATED ORAL at 22:33

## 2021-05-23 RX ADMIN — THIAMINE HCL TAB 100 MG 100 MG: 100 TAB at 08:08

## 2021-05-23 RX ADMIN — ACETAMINOPHEN 1000 MG: 500 TABLET, FILM COATED ORAL at 08:07

## 2021-05-23 RX ADMIN — ASPIRIN 81 MG: 81 TABLET, COATED ORAL at 20:16

## 2021-05-24 LAB
COTININE SERPL-MCNC: 7 NG/ML
NICOTINE SERPL-MCNC: <1 NG/ML

## 2021-05-24 PROCEDURE — 97535 SELF CARE MNGMENT TRAINING: CPT

## 2021-05-24 PROCEDURE — 97110 THERAPEUTIC EXERCISES: CPT

## 2021-05-24 PROCEDURE — 94799 UNLISTED PULMONARY SVC/PX: CPT

## 2021-05-24 PROCEDURE — 97530 THERAPEUTIC ACTIVITIES: CPT

## 2021-05-24 PROCEDURE — 97116 GAIT TRAINING THERAPY: CPT

## 2021-05-24 RX ADMIN — ACETAMINOPHEN 1000 MG: 500 TABLET, FILM COATED ORAL at 17:04

## 2021-05-24 RX ADMIN — FOLIC ACID 1 MG: 1 TABLET ORAL at 08:45

## 2021-05-24 RX ADMIN — ASPIRIN 81 MG: 81 TABLET, COATED ORAL at 20:16

## 2021-05-24 RX ADMIN — CARVEDILOL 12.5 MG: 12.5 TABLET, FILM COATED ORAL at 17:04

## 2021-05-24 RX ADMIN — BUDESONIDE AND FORMOTEROL FUMARATE DIHYDRATE 2 PUFF: 80; 4.5 AEROSOL RESPIRATORY (INHALATION) at 20:35

## 2021-05-24 RX ADMIN — CARVEDILOL 12.5 MG: 12.5 TABLET, FILM COATED ORAL at 08:44

## 2021-05-24 RX ADMIN — ACETAMINOPHEN 1000 MG: 500 TABLET, FILM COATED ORAL at 23:47

## 2021-05-24 RX ADMIN — THERA TABS 1 TABLET: TAB at 08:45

## 2021-05-24 RX ADMIN — DOXYCYCLINE 100 MG: 100 CAPSULE ORAL at 20:16

## 2021-05-24 RX ADMIN — THIAMINE HCL TAB 100 MG 100 MG: 100 TAB at 08:45

## 2021-05-24 RX ADMIN — ASPIRIN 81 MG: 81 TABLET, COATED ORAL at 08:45

## 2021-05-24 RX ADMIN — TRAMADOL HYDROCHLORIDE 50 MG: 50 TABLET, FILM COATED ORAL at 16:32

## 2021-05-24 RX ADMIN — LISINOPRIL 5 MG: 5 TABLET ORAL at 08:45

## 2021-05-24 RX ADMIN — BUDESONIDE AND FORMOTEROL FUMARATE DIHYDRATE 2 PUFF: 80; 4.5 AEROSOL RESPIRATORY (INHALATION) at 09:00

## 2021-05-24 RX ADMIN — ACETAMINOPHEN 1000 MG: 500 TABLET, FILM COATED ORAL at 08:45

## 2021-05-24 RX ADMIN — MONTELUKAST SODIUM 10 MG: 10 TABLET, COATED ORAL at 08:45

## 2021-05-24 RX ADMIN — DOXYCYCLINE 100 MG: 100 CAPSULE ORAL at 08:44

## 2021-05-24 RX ADMIN — TRAMADOL HYDROCHLORIDE 50 MG: 50 TABLET, FILM COATED ORAL at 07:04

## 2021-05-24 RX ADMIN — ROSUVASTATIN CALCIUM 10 MG: 10 TABLET, COATED ORAL at 08:45

## 2021-05-25 VITALS
WEIGHT: 113.1 LBS | OXYGEN SATURATION: 93 % | RESPIRATION RATE: 18 BRPM | HEART RATE: 85 BPM | TEMPERATURE: 98.5 F | SYSTOLIC BLOOD PRESSURE: 142 MMHG | HEIGHT: 66 IN | BODY MASS INDEX: 18.18 KG/M2 | DIASTOLIC BLOOD PRESSURE: 73 MMHG

## 2021-05-25 PROCEDURE — 94799 UNLISTED PULMONARY SVC/PX: CPT

## 2021-05-25 PROCEDURE — 97116 GAIT TRAINING THERAPY: CPT

## 2021-05-25 RX ORDER — CEFADROXIL 500 MG/1
500 CAPSULE ORAL 2 TIMES DAILY
Qty: 28 CAPSULE | Refills: 0
Start: 2021-05-25 | End: 2021-06-08

## 2021-05-25 RX ORDER — TRAMADOL HYDROCHLORIDE 50 MG/1
50 TABLET ORAL EVERY 6 HOURS PRN
Start: 2021-05-25

## 2021-05-25 RX ORDER — ASPIRIN 81 MG/1
81 TABLET, DELAYED RELEASE ORAL EVERY 12 HOURS
Qty: 84 TABLET | Refills: 0
Start: 2021-05-25 | End: 2021-07-06

## 2021-05-25 RX ORDER — ONDANSETRON 4 MG/1
4 TABLET, FILM COATED ORAL EVERY 8 HOURS PRN
Start: 2021-05-25

## 2021-05-25 RX ORDER — ACETAMINOPHEN 500 MG
1000 TABLET ORAL EVERY 8 HOURS
Qty: 42 TABLET | Refills: 0
Start: 2021-05-25 | End: 2021-06-01

## 2021-05-25 RX ADMIN — ACETAMINOPHEN 1000 MG: 500 TABLET, FILM COATED ORAL at 09:17

## 2021-05-25 RX ADMIN — BUDESONIDE AND FORMOTEROL FUMARATE DIHYDRATE 2 PUFF: 80; 4.5 AEROSOL RESPIRATORY (INHALATION) at 07:42

## 2021-05-25 RX ADMIN — FOLIC ACID 1 MG: 1 TABLET ORAL at 09:18

## 2021-05-25 RX ADMIN — THIAMINE HCL TAB 100 MG 100 MG: 100 TAB at 09:18

## 2021-05-25 RX ADMIN — CARVEDILOL 12.5 MG: 12.5 TABLET, FILM COATED ORAL at 09:18

## 2021-05-25 RX ADMIN — ROSUVASTATIN CALCIUM 10 MG: 10 TABLET, COATED ORAL at 09:18

## 2021-05-25 RX ADMIN — LABETALOL 20 MG/4 ML (5 MG/ML) INTRAVENOUS SYRINGE 10 MG: at 05:51

## 2021-05-25 RX ADMIN — ASPIRIN 81 MG: 81 TABLET, COATED ORAL at 09:18

## 2021-05-25 RX ADMIN — THERA TABS 1 TABLET: TAB at 09:18

## 2021-05-25 RX ADMIN — LISINOPRIL 5 MG: 5 TABLET ORAL at 09:18

## 2021-05-25 RX ADMIN — TRAMADOL HYDROCHLORIDE 50 MG: 50 TABLET, FILM COATED ORAL at 05:47

## 2021-05-25 RX ADMIN — DOXYCYCLINE 100 MG: 100 CAPSULE ORAL at 09:18

## 2021-05-25 RX ADMIN — TRAMADOL HYDROCHLORIDE 50 MG: 50 TABLET, FILM COATED ORAL at 13:16

## 2021-05-25 RX ADMIN — MONTELUKAST SODIUM 10 MG: 10 TABLET, COATED ORAL at 09:18

## 2021-06-24 ENCOUNTER — TRANSCRIBE ORDERS (OUTPATIENT)
Dept: ADMINISTRATIVE | Facility: HOSPITAL | Age: 71
End: 2021-06-24

## 2024-03-05 ENCOUNTER — TELEPHONE (OUTPATIENT)
Dept: GASTROENTEROLOGY | Facility: CLINIC | Age: 74
End: 2024-03-05
Payer: MEDICARE

## 2024-03-05 NOTE — TELEPHONE ENCOUNTER
Patient is not established with office. Sent referral back to [facility] staff to advise patient having PCP refer them to office or seeking advice from PCP.

## 2024-03-05 NOTE — TELEPHONE ENCOUNTER
Caller: Emily Cevallos    Relationship to patient: Self    Best call back number: [phone]     Patient is needing: PT FRIEND CALLED IN ON BEHALF OF PATIENT (ANGELES LISA [phone]). PT TRIED TO GET MEDICAL RECORDS AND WAS TOLD THAT IT WOULD TAKE 30 DAYS TO RECEIVE. PT IS NOT ABLE TO WAIT THAT LONG BEFORE BEING SEEN DUE TO DX.    PT IS RAPIDLY LOSING WEIGHT FROM CONSTANT DIARRHEA.

## (undated) DEVICE — SUT MNCRYL PLS ANTIB UD 3/0 PS2 27IN

## (undated) DEVICE — PATIENT RETURN ELECTRODE, SINGLE-USE, CONTACT QUALITY MONITORING, ADULT, WITH 9FT CORD, FOR PATIENTS WEIGING OVER 33LBS. (15KG): Brand: MEGADYNE

## (undated) DEVICE — PENCL ROCKRSWCH MEGADYNE W/HOLSTR 10FT SS

## (undated) DEVICE — PREMIUM DRY TRAY LF: Brand: MEDLINE INDUSTRIES, INC.

## (undated) DEVICE — 6617 IOBAN II PATIENT ISOLATION DRAPE 5/BX,4BX/CS: Brand: STERI-DRAPE™ IOBAN™ 2

## (undated) DEVICE — ELECTRD BLD EZ CLN STD 2.5IN

## (undated) DEVICE — MARKER,SKIN,W/RULER,DUAL,STOP: Brand: MEDLINE

## (undated) DEVICE — NDL HYPO ECLPS SFTY 22G 1 1/2IN

## (undated) DEVICE — PK MAJ SHLDR SPLT 10

## (undated) DEVICE — C-ARM DRAPE: Brand: DEROYAL

## (undated) DEVICE — STRYKER PERFORMANCE SERIES SAGITTAL BLADE: Brand: STRYKER PERFORMANCE SERIES

## (undated) DEVICE — SPK10295 ORTHOPEDIC FRACTURE KIT: Brand: SPK10295 ORTHOPEDIC FRACTURE KIT

## (undated) DEVICE — ELECTRD BLD EZ CLN STD 6.5IN

## (undated) DEVICE — ANTIBACTERIAL UNDYED BRAIDED (POLYGLACTIN 910), SYNTHETIC ABSORBABLE SUTURE: Brand: COATED VICRYL

## (undated) DEVICE — 3M™ STERI-STRIP™ REINFORCED ADHESIVE SKIN CLOSURES, R1547, 1/2 IN X 4 IN (12 MM X 100 MM), 6 STRIPS/ENVELOPE: Brand: 3M™ STERI-STRIP™

## (undated) DEVICE — ISO/ALC 70PCT 16OZ

## (undated) DEVICE — PREP SOL POVIDONE/IODINE BT 4OZ

## (undated) DEVICE — DRSNG WND STRIP OPTIFOAM AG SUPRABS A/MIC 4X8IN STRL

## (undated) DEVICE — ADHS SKIN PREMIERPRO EXOFIN TOPICAL HI/VISC .5ML

## (undated) DEVICE — DISPOSABLE ORTHOPAEDIC PROTECTOR: Brand: ALEXIS ® ORTHOPAEDIC PROTECTOR

## (undated) DEVICE — SYR CONTRL LUERLOK 10CC

## (undated) DEVICE — SCRB SURG BACTOSHIELD CHG 4PCT 4OZ

## (undated) DEVICE — HANDPIECE SET WITH HIGH FLOW TIP AND SUCTION TUBE: Brand: INTERPULSE

## (undated) DEVICE — UNDERGLV SURG BIOGEL INDICAT PI SZ8.5 BLU

## (undated) DEVICE — 1010 S-DRAPE TOWEL DRAPE 10/BX: Brand: STERI-DRAPE™

## (undated) DEVICE — GLV SURG SENSICARE PI ORTHO SZ8.5 LF STRL

## (undated) DEVICE — SEAL FEM EXETER 1/2/MOON DISP

## (undated) DEVICE — Device

## (undated) DEVICE — SHEET,DRAPE,53X77,STERILE: Brand: MEDLINE